# Patient Record
Sex: FEMALE | HISPANIC OR LATINO | Employment: OTHER | URBAN - METROPOLITAN AREA
[De-identification: names, ages, dates, MRNs, and addresses within clinical notes are randomized per-mention and may not be internally consistent; named-entity substitution may affect disease eponyms.]

---

## 2017-11-30 ENCOUNTER — OFFICE VISIT (OUTPATIENT)
Dept: INTERNAL MEDICINE | Facility: CLINIC | Age: 72
End: 2017-11-30
Payer: MEDICARE

## 2017-11-30 VITALS
OXYGEN SATURATION: 99 % | DIASTOLIC BLOOD PRESSURE: 102 MMHG | WEIGHT: 152.56 LBS | HEART RATE: 95 BPM | HEIGHT: 62 IN | SYSTOLIC BLOOD PRESSURE: 142 MMHG | BODY MASS INDEX: 28.07 KG/M2

## 2017-11-30 DIAGNOSIS — D68.00 VON WILLEBRAND DISEASE: ICD-10-CM

## 2017-11-30 DIAGNOSIS — E04.1 THYROID NODULE: ICD-10-CM

## 2017-11-30 DIAGNOSIS — E11.9 TYPE 2 DIABETES MELLITUS WITHOUT COMPLICATION, WITHOUT LONG-TERM CURRENT USE OF INSULIN: ICD-10-CM

## 2017-11-30 DIAGNOSIS — I10 HYPERTENSION, UNSPECIFIED TYPE: ICD-10-CM

## 2017-11-30 DIAGNOSIS — C50.411 MALIGNANT NEOPLASM OF UPPER-OUTER QUADRANT OF RIGHT FEMALE BREAST, UNSPECIFIED ESTROGEN RECEPTOR STATUS: Primary | ICD-10-CM

## 2017-11-30 DIAGNOSIS — K21.9 GASTROESOPHAGEAL REFLUX DISEASE, ESOPHAGITIS PRESENCE NOT SPECIFIED: ICD-10-CM

## 2017-11-30 DIAGNOSIS — M19.90 ARTHRITIS: ICD-10-CM

## 2017-11-30 PROCEDURE — 99204 OFFICE O/P NEW MOD 45 MIN: CPT | Mod: S$GLB,,, | Performed by: NURSE PRACTITIONER

## 2017-11-30 PROCEDURE — 99999 PR PBB SHADOW E&M-NEW PATIENT-LVL V: CPT | Mod: PBBFAC,,, | Performed by: NURSE PRACTITIONER

## 2017-11-30 RX ORDER — ARNICA 200 MG/ML
1 LIQUID TOPICAL DAILY
Qty: 120 ML | Refills: 0 | COMMUNITY
Start: 2017-11-30 | End: 2018-05-15

## 2017-11-30 RX ORDER — CAPTOPRIL 50 MG/1
50 TABLET ORAL 3 TIMES DAILY
COMMUNITY
End: 2018-02-15 | Stop reason: SDUPTHER

## 2017-11-30 RX ORDER — GLIMEPIRIDE 4 MG/1
4 TABLET ORAL
COMMUNITY
End: 2018-02-15 | Stop reason: SDUPTHER

## 2017-11-30 RX ORDER — OMEPRAZOLE 40 MG/1
20 CAPSULE, DELAYED RELEASE ORAL DAILY
COMMUNITY
End: 2018-02-15 | Stop reason: SDUPTHER

## 2017-11-30 RX ORDER — ANASTROZOLE 1 MG/1
1 TABLET ORAL DAILY
COMMUNITY
End: 2018-02-15 | Stop reason: SDUPTHER
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
## 2017-12-01 ENCOUNTER — LAB VISIT (OUTPATIENT)
Dept: LAB | Facility: HOSPITAL | Age: 72
End: 2017-12-01
Attending: NURSE PRACTITIONER
Payer: MEDICARE

## 2017-12-01 ENCOUNTER — TELEPHONE (OUTPATIENT)
Dept: INTERNAL MEDICINE | Facility: CLINIC | Age: 72
End: 2017-12-01

## 2017-12-01 DIAGNOSIS — C50.411 MALIGNANT NEOPLASM OF UPPER-OUTER QUADRANT OF RIGHT FEMALE BREAST, UNSPECIFIED ESTROGEN RECEPTOR STATUS: ICD-10-CM

## 2017-12-01 DIAGNOSIS — I10 HYPERTENSION, UNSPECIFIED TYPE: ICD-10-CM

## 2017-12-01 DIAGNOSIS — E11.9 TYPE 2 DIABETES MELLITUS WITHOUT COMPLICATION, WITHOUT LONG-TERM CURRENT USE OF INSULIN: ICD-10-CM

## 2017-12-01 PROBLEM — K21.9 GASTROESOPHAGEAL REFLUX DISEASE: Status: ACTIVE | Noted: 2017-12-01

## 2017-12-01 PROBLEM — E04.1 THYROID NODULE: Status: ACTIVE | Noted: 2017-12-01

## 2017-12-01 PROBLEM — M19.90 ARTHRITIS: Status: ACTIVE | Noted: 2017-12-01

## 2017-12-01 PROBLEM — D68.00 VON WILLEBRAND DISEASE: Status: ACTIVE | Noted: 2017-12-01

## 2017-12-01 LAB
ALBUMIN SERPL BCP-MCNC: 4.2 G/DL
ALP SERPL-CCNC: 108 U/L
ALT SERPL W/O P-5'-P-CCNC: 18 U/L
ANION GAP SERPL CALC-SCNC: 9 MMOL/L
AST SERPL-CCNC: 22 U/L
BASOPHILS # BLD AUTO: 0.02 K/UL
BASOPHILS NFR BLD: 0.4 %
BILIRUB SERPL-MCNC: 1.4 MG/DL
BUN SERPL-MCNC: 16 MG/DL
CALCIUM SERPL-MCNC: 10.4 MG/DL
CHLORIDE SERPL-SCNC: 101 MMOL/L
CHOLEST SERPL-MCNC: 162 MG/DL
CHOLEST/HDLC SERPL: 4.4 {RATIO}
CO2 SERPL-SCNC: 31 MMOL/L
CREAT SERPL-MCNC: 0.8 MG/DL
DIFFERENTIAL METHOD: NORMAL
EOSINOPHIL # BLD AUTO: 0.1 K/UL
EOSINOPHIL NFR BLD: 1.5 %
ERYTHROCYTE [DISTWIDTH] IN BLOOD BY AUTOMATED COUNT: 11.9 %
EST. GFR  (AFRICAN AMERICAN): >60 ML/MIN/1.73 M^2
EST. GFR  (NON AFRICAN AMERICAN): >60 ML/MIN/1.73 M^2
ESTIMATED AVG GLUCOSE: 143 MG/DL
GLUCOSE SERPL-MCNC: 122 MG/DL
HBA1C MFR BLD HPLC: 6.6 %
HCT VFR BLD AUTO: 39.5 %
HDLC SERPL-MCNC: 37 MG/DL
HDLC SERPL: 22.8 %
HGB BLD-MCNC: 13.3 G/DL
LDLC SERPL CALC-MCNC: 78.4 MG/DL
LYMPHOCYTES # BLD AUTO: 1.7 K/UL
LYMPHOCYTES NFR BLD: 34.7 %
MCH RBC QN AUTO: 30.1 PG
MCHC RBC AUTO-ENTMCNC: 33.7 G/DL
MCV RBC AUTO: 89 FL
MONOCYTES # BLD AUTO: 0.5 K/UL
MONOCYTES NFR BLD: 10.8 %
NEUTROPHILS # BLD AUTO: 2.5 K/UL
NEUTROPHILS NFR BLD: 52.6 %
NONHDLC SERPL-MCNC: 125 MG/DL
PLATELET # BLD AUTO: 194 K/UL
PMV BLD AUTO: 11 FL
POTASSIUM SERPL-SCNC: 4.2 MMOL/L
PROT SERPL-MCNC: 7.9 G/DL
RBC # BLD AUTO: 4.42 M/UL
SODIUM SERPL-SCNC: 141 MMOL/L
TRIGL SERPL-MCNC: 233 MG/DL
WBC # BLD AUTO: 4.81 K/UL

## 2017-12-01 PROCEDURE — 80061 LIPID PANEL: CPT

## 2017-12-01 PROCEDURE — 80053 COMPREHEN METABOLIC PANEL: CPT

## 2017-12-01 PROCEDURE — 83036 HEMOGLOBIN GLYCOSYLATED A1C: CPT

## 2017-12-01 PROCEDURE — 36415 COLL VENOUS BLD VENIPUNCTURE: CPT

## 2017-12-01 PROCEDURE — 85025 COMPLETE CBC W/AUTO DIFF WBC: CPT

## 2017-12-01 NOTE — TELEPHONE ENCOUNTER
----- Message from Vesta Townsend RN sent at 12/1/2017 11:38 AM CST -----  Contact: self  Patient said she brought medical records to the visit yesterday.  Does your office still have it or has it been sent to scanning?    - Vesta Townsend RN    Cancer Navigator  ----- Message -----  From: Mora Rubi RN  Sent: 12/1/2017  10:49 AM  To: Vesta Townsend RN        ----- Message -----  From: Leighann Espinoza  Sent: 11/30/2017  10:16 AM  To: Mora Rubi RN    Pt has a referral per Ms Ervin with the diagnosis Malignant neoplasm of upper-outer quadrant of right female breast, unspecified estrogen receptor status [C50.411]. Please call pt to schedule. Thanks!

## 2017-12-04 ENCOUNTER — TELEPHONE (OUTPATIENT)
Dept: HEMATOLOGY/ONCOLOGY | Facility: CLINIC | Age: 72
End: 2017-12-04
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
## 2017-12-05 ENCOUNTER — OFFICE VISIT (OUTPATIENT)
Dept: OPTOMETRY | Facility: CLINIC | Age: 72
End: 2017-12-05
Payer: MEDICARE

## 2017-12-05 ENCOUNTER — TELEPHONE (OUTPATIENT)
Dept: INTERNAL MEDICINE | Facility: CLINIC | Age: 72
End: 2017-12-05

## 2017-12-05 DIAGNOSIS — H52.4 PRESBYOPIA: ICD-10-CM

## 2017-12-05 DIAGNOSIS — E11.9 TYPE 2 DIABETES MELLITUS WITHOUT OPHTHALMIC MANIFESTATIONS: Primary | ICD-10-CM

## 2017-12-05 DIAGNOSIS — E78.49 OTHER HYPERLIPIDEMIA: ICD-10-CM

## 2017-12-05 DIAGNOSIS — H25.13 NUCLEAR SCLEROSIS OF BOTH EYES: ICD-10-CM

## 2017-12-05 PROCEDURE — 99999 PR PBB SHADOW E&M-EST. PATIENT-LVL II: CPT | Mod: PBBFAC,,, | Performed by: OPTOMETRIST

## 2017-12-05 PROCEDURE — 92004 COMPRE OPH EXAM NEW PT 1/>: CPT | Mod: S$GLB,,, | Performed by: OPTOMETRIST

## 2017-12-05 RX ORDER — ATORVASTATIN CALCIUM 10 MG/1
10 TABLET, FILM COATED ORAL DAILY
Qty: 90 TABLET | Refills: 3 | Status: SHIPPED | OUTPATIENT
Start: 2017-12-05 | End: 2018-02-15 | Stop reason: SDUPTHER

## 2017-12-05 NOTE — TELEPHONE ENCOUNTER
----- Message from Екатерина Ervin NP sent at 12/5/2017 12:51 PM CST -----  Pt is [place] speaking: Please let pt know her labs were stable. Cholesterol is not at goal. Will start on lipitor to decrease cholesterol. Please take at night

## 2017-12-05 NOTE — LETTER
December 5, 2017      Екатерина Ervin, NP  [address]  [facility] [address]           [facility]  [address]  [facility] [address]  Phone: [phone]  Fax: [phone]          Patient: Melissa Jarquin   MR Number: [id number]   YOB: 1945   Date of Visit: 12/5/2017       Dear Екатерина Ervin:    Thank you for referring Melissa Jarquin to me for evaluation. Attached you will find relevant portions of my assessment and plan of care.    If you have questions, please do not hesitate to call me. I look forward to following Melissa Jarquin along with you.    Sincerely,    Letitia Joseph, OD    Enclosure  CC:  No Recipients    If you would like to receive this communication electronically, please contact [email or web address] or [phone] to request more information on [employer] access.    For providers and/or their staff who would like to refer a patient to [facility], please contact us through our one-stop-shop provider referral line, [facility], at [phone].    If you feel you have received this communication in error or would no longer like to receive these types of communications, please e-mail [email or web address]

## 2017-12-05 NOTE — PROGRESS NOTES
HPI     Patient's last dilated exam was: 02/2017  Pt here for diabetic eye exam. Happy with current glasses, does not want   to update glasses, she was seen in February and told no changes in her rx.   here for ocular health check only. C/o occasional itching in the temporal   corners of her eyes, hailey in the morning. Slight blurred vision in the   morning, no tearing, dryness, burning. Patient denies flashes, floaters,   pain and double vision. Not using any gtts. Blood sugar was 137 yesterday      Hemoglobin A1C       Date                     Value               Ref Range             Status                12/01/2017               6.6 (H)             4.0 - 5.6 %           Final                  Last edited by Carol Reece, PCT on 12/5/2017  2:25 PM.   (History)            Assessment /Plan     For exam results, see Encounter Report.    Type 2 diabetes mellitus without ophthalmic manifestations    Nuclear sclerosis of both eyes    Presbyopia            1.  No retinopathy--monitor yearly.  BS control.  2.  Educated on cataracts and affects on vision.  Monitor.  3.  Continue w/ current rx.  Eye health normal OU.        RTC 1 year for dm exam.

## 2017-12-14 ENCOUNTER — TELEPHONE (OUTPATIENT)
Dept: HEMATOLOGY/ONCOLOGY | Facility: CLINIC | Age: 72
End: 2017-12-14

## 2018-01-03 ENCOUNTER — CLINICAL SUPPORT (OUTPATIENT)
Dept: REHABILITATION | Facility: HOSPITAL | Age: 73
End: 2018-01-03
Payer: MEDICARE

## 2018-01-03 DIAGNOSIS — G89.18 POST-MASTECTOMY PAIN: ICD-10-CM

## 2018-01-03 DIAGNOSIS — I97.2 POST-MASTECTOMY LYMPHEDEMA SYNDROME: ICD-10-CM

## 2018-01-03 PROCEDURE — 97162 PT EVAL MOD COMPLEX 30 MIN: CPT

## 2018-01-03 PROCEDURE — 97110 THERAPEUTIC EXERCISES: CPT

## 2018-01-03 PROCEDURE — G8984 CARRY CURRENT STATUS: HCPCS | Mod: CK

## 2018-01-03 PROCEDURE — G8985 CARRY GOAL STATUS: HCPCS | Mod: CJ

## 2018-01-03 PROCEDURE — 97535 SELF CARE MNGMENT TRAINING: CPT
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
## 2018-01-10 ENCOUNTER — CLINICAL SUPPORT (OUTPATIENT)
Dept: REHABILITATION | Facility: HOSPITAL | Age: 73
End: 2018-01-10
Payer: MEDICARE

## 2018-01-10 DIAGNOSIS — G89.18 POST-MASTECTOMY PAIN: ICD-10-CM

## 2018-01-10 DIAGNOSIS — I97.2 POST-MASTECTOMY LYMPHEDEMA SYNDROME: ICD-10-CM

## 2018-01-10 PROCEDURE — 97140 MANUAL THERAPY 1/> REGIONS: CPT

## 2018-01-10 PROCEDURE — 97110 THERAPEUTIC EXERCISES: CPT

## 2018-01-26 ENCOUNTER — CLINICAL SUPPORT (OUTPATIENT)
Dept: REHABILITATION | Facility: HOSPITAL | Age: 73
End: 2018-01-26
Payer: MEDICARE

## 2018-01-26 DIAGNOSIS — I97.2 POST-MASTECTOMY LYMPHEDEMA SYNDROME: ICD-10-CM

## 2018-01-26 DIAGNOSIS — G89.18 POST-MASTECTOMY PAIN: ICD-10-CM

## 2018-01-26 PROCEDURE — 97110 THERAPEUTIC EXERCISES: CPT

## 2018-01-26 PROCEDURE — 97140 MANUAL THERAPY 1/> REGIONS: CPT

## 2018-01-26 NOTE — PROGRESS NOTES
Physician: Rosemary Ritter [facility]  Diagnosis: R breast CA with lumpectomy 1/2016 6 nodes removed, chemotherapy and radiation in [place]  Encounter Diagnoses   Name Primary?    Post-mastectomy lymphedema syndrome     Post-mastectomy pain         Visit #:3  Treatment: manual therapy, therex, education  * Pt and her grandson were given information for Anguillan translation - they again declined.    SUBJECTIVE: Pt's grandson is present entire visit and translates Anguillan- They bring Xray report of R shoulder from [facility]  8/18/2017 Impression :  Calcific rotator cuff tendinopathy,  post operative change   pt does understand some English for basic commands- grandson translates all - Pt has some shoulder pain that is mild most all of the time and certain motions increase discomfort.  Pt does not report breast pain or complaints.   Pt has been performing HEP.   Pain: 2/10 R shoulder  OBJECTIVE: Anguillan speaking female- amb to dept no device  Grandson present for full translation  L port in place  R breast with lumpectomy and AND sites fully healed  R breast with some density surrounding nipple and inf segment of breast  Amount of Swelling:Location of Swelling: little to no lymphedema appreciated to R UE  Fullness and some density to R breast  Tightness and pulling in axilla and breast with R SH ROM  Mild soreness to R pec major near ant chest and humerus  Skin Integrity: healed, dryness similar R and L UE  Palpation/Texture: mld/mod to R breast and tautness axilla  Circulation: intact     Posture:FH rounded shoulders protraction- min correction with repeat cueing     Range of Motion - UE  (R)  Supine AAROM - ~ 145 flex, ~ 140 abd AROM due to c/o pulling and tightness in axilla and breast    ROT ER at 90 wfl,  IR with mild discomfort at end ranges  (L) mild limits end range no pain  Treatment:   MANUAL LYMPHATIC DRAINAGE:  While supine with arm elevated,  Drainage along neck, B subclavian regions,   Across ant chest and top of shoulder,  Axillary region, drainage of upper arm with return of all areas proximally, scar massage and drainage of her R axilla and R breast with repeat across chest and axillary regions  In sidelying stimulation of substitution pathways,  drainage post arm, and across back and down along trunk  SEQUENTIAL COMPRESSION PUMP: na  MULTILAYERED BANDAGING:  Na  THERAPEUTIC EXERCISES:    Gentle sh distraction, long axis   Inf glides  Assistance with ROM  Arm place overhead in sh abd flex and er on pillow- stm and massage to axilla, ant and lateral chest wall  ROM and soft tissue mobility to ribs and shoulder blade  sidelying reach protraction and retraction   Reviewed all prior ROM and postural corrections: dowel assisted sh flex and abd,  Scapular retractions, pec stretch in corner or doorway,  Thor sbing with arms overhead,  UE reach with spinal elongation, bow and arrow style stretch of reach and rotate, and UE and spinal elongation of table walk aways.    Performed assisted stretching and ROM R upper quadrant, stm and mobility to ant and lateral chest wall and axilla  R UE ROM in flex abd and er  Added sidelying sh er, sh abd  Performed Pulley assisted flex  Issued orange T band  Performed rowing and retraction with EE and EF  Sh IR and ER R with orange- grandson instructed in band placement in door  Confirmed understanding of all exercises with him and her.   Pt was able to demonstrate and voice understanding of performance. Continue HEP of AROM, stretching, and postural correction.   PATIENT/FAMILY Education:  HEP,  Beginning of self massage,  Risk reduction  Confirmed all understanding with patient and grandson  ASSESSMENT: Pt has some pec tightness and effects post lumpectomy with node dissection and radiation but also has arthritic and calcific changes to her R shoulder with rotator cuff changes.  Working to address posture, ROM, tissue mobility and progression to improving function  with less pain.   Mild density to breast but no sings of R UE lymphedema.   Pt is showing more flexibility and soft tissue mobility in her R upper quadrant. Pt needs to continue to address postural correction and some tightness in her R upper quadrant post CA lumpectomy as well as radiation changes to anterior, lateral chest walls, breast and axilla.    Patient is making  good     progress towards established goals.  PLAN: Continue PT   1-2x         weekly for Complete Decongestive Therapy:  Manual lymphatic drainage, Multilayered short stretch bandaging, Pneumatic compression, Therapeutic exercises, Patient education as deemed necessary to achieve stated goals.  Plan of care for Yessy Mcgill PTA.

## 2018-01-29 ENCOUNTER — TELEPHONE (OUTPATIENT)
Dept: HEMATOLOGY/ONCOLOGY | Facility: CLINIC | Age: 73
End: 2018-01-29

## 2018-01-29 ENCOUNTER — CLINICAL SUPPORT (OUTPATIENT)
Dept: REHABILITATION | Facility: HOSPITAL | Age: 73
End: 2018-01-29
Payer: MEDICARE

## 2018-01-29 DIAGNOSIS — G89.18 POST-MASTECTOMY PAIN: ICD-10-CM

## 2018-01-29 DIAGNOSIS — M19.90 ARTHRITIS: Primary | ICD-10-CM

## 2018-01-29 DIAGNOSIS — I97.2 POST-MASTECTOMY LYMPHEDEMA SYNDROME: ICD-10-CM

## 2018-01-29 PROCEDURE — 97110 THERAPEUTIC EXERCISES: CPT

## 2018-01-29 PROCEDURE — 97140 MANUAL THERAPY 1/> REGIONS: CPT

## 2018-01-31 NOTE — PROGRESS NOTES
Physical Therapy Progress Note - Hip/Knee/Ankle/Spine  R shoulder pain/weakness     Subjective:  Patient reports pain 5/10 prior therapy and decrease at the end of session.     Objective:  Treatment:   Pt received therapeutic exercises to improve ROM, strength, flexibility and proprioception such as:   UBE scapula retraction/protraction 3 minutes each direction   Door pulleys shoulder scaption 3 minutes   Shoulder flexion and HA AAROM using theraball 10 reps each   Shoulder row and extension otb 10 reps each   Shoulder IR/ER walk out otb 10 reps each - Josefina for proper body mechanics   Supine:  shoulder flexion using stick 10 reps   Elbow extension using stick 10 reps   Pt received PROM/gentle stretch on R shoulder in all available plane several trials   ROM:not tested today     Written Home Exercises Provided:no changes made.   Instructed pt. regarding: Proper technique with all exercises. Pt demonstrated good understanding of the education provided. No cultural, environmental, or spiritual barriers identified to treatment or learning.    Assessment:Pt with good tolerance to PT today, no increase of pain during session. Pt presents very weak UEs muscles and needs Josefina some time to complete motion. Pt also needs tactile cues for proper body mechanics.   Pt will continue to benefit from skilled PT intervention. Medical Necessity is demonstrated by:  Unable to participate in daily activities    Patient is making Good progress towards established goals.    New/Revised Goals:remain appropriated     Plan:  Continue with established Plan of Care towards PT goals. PT/PTA face-to-face:discussed Pt's POC and progress towards established PT goals.  Yessy Mcgill PTA  Therex:30 minutes   Soft Tissue Mobs:10 minutes

## 2018-02-06 ENCOUNTER — CLINICAL SUPPORT (OUTPATIENT)
Dept: REHABILITATION | Facility: HOSPITAL | Age: 73
End: 2018-02-06
Payer: MEDICARE

## 2018-02-06 DIAGNOSIS — G89.18 POST-MASTECTOMY PAIN: ICD-10-CM

## 2018-02-06 DIAGNOSIS — I97.2 POST-MASTECTOMY LYMPHEDEMA SYNDROME: ICD-10-CM

## 2018-02-06 PROCEDURE — 97140 MANUAL THERAPY 1/> REGIONS: CPT

## 2018-02-06 PROCEDURE — 97110 THERAPEUTIC EXERCISES: CPT

## 2018-02-07 NOTE — PROGRESS NOTES
Physical Therapy Progress Note - Hip/Knee/Ankle/Spine  R shoulder pain/weakness     Subjective:  Patient reports pain 4/10 today. Pt states feeling much better since started therapy. PTA speaking Romanian with Pt and received 1:1 care today.     Objective:  Treatment:   Pt received therapeutic exercises to improve ROM, strength, flexibility and proprioception such as:   UBE scapula retraction/protraction 3 minutes each direction   Door pulleys shoulder scaption 3 minutes   Shoulder flexion and HA AAROM using theraball 10 reps each   Shoulder row and extension otb 10 reps each   Shoulder IR/ER walk out otb 10 reps each - Josefina for proper body mechanics   Supine:  shoulder flexion using stick 10 reps   Elbow extension using stick 10 reps   Pt received PROM/gentle stretch on R shoulder in all available plane several trials   ROM:not tested today     Written Home Exercises Provided:no changes made.   Instructed pt. regarding: Proper technique with all exercises. Pt demonstrated good understanding of the education provided. No cultural, environmental, or spiritual barriers identified to treatment or learning.    Assessment:Pt with good tolerance to PT today, no increase of pain during session. Pt with less assistance to complete motion today. Pt also needs tactile cues for proper body mechanics.   Pt will continue to benefit from skilled PT intervention. Medical Necessity is demonstrated by:  Unable to participate in daily activities    Patient is making Good progress towards established goals.    New/Revised Goals:remain appropriated     Plan:  Continue with established Plan of Care towards PT goals.   Therex:30 minutes   Soft Tissue Mobs:10 minutes

## 2018-02-14 ENCOUNTER — CLINICAL SUPPORT (OUTPATIENT)
Dept: REHABILITATION | Facility: HOSPITAL | Age: 73
End: 2018-02-14
Payer: MEDICARE

## 2018-02-14 DIAGNOSIS — G89.18 POST-MASTECTOMY PAIN: ICD-10-CM

## 2018-02-14 DIAGNOSIS — I97.2 POST-MASTECTOMY LYMPHEDEMA SYNDROME: ICD-10-CM

## 2018-02-14 PROCEDURE — 97110 THERAPEUTIC EXERCISES: CPT

## 2018-02-14 PROCEDURE — 97140 MANUAL THERAPY 1/> REGIONS: CPT

## 2018-02-14 NOTE — PROGRESS NOTES
Physician: Rosemary Ritter [facility]  Diagnosis: R breast CA with lumpectomy 1/2016 6 nodes removed, chemotherapy and radiation in [place]  Encounter Diagnoses   Name Primary?    Post-mastectomy lymphedema syndrome     Post-mastectomy pain         Visit #:6  5th visit FOTO 49/100  Treatment: manual therapy, therex, education  * Pt and her grandson have declined Latvian Interpretor.     SUBJECTIVE: Pt's grandson is present entire visit and translates Latvian- Pt is moving more with less discomfort ~ 3-4/10 R shoulder.  Pt did voice mild pulling/soreness in L sh at end ranges mostly near port site- confirmed with grandson/pt- avoid excessive pulling at port site- stay below tension- voiced understanding.   pt does understand some English for basic commands- grandson translates all -   Pt is no longer having much breast pain- mostly shoulder soreness.  She laughs when encouraged to correct her posture.   Pt has been performing HEP.   Pain: 2/10 R shoulder  OBJECTIVE: Latvian speaking female- amb to dept no device  Grandson present for full translation  L port in place  R breast with lumpectomy and AND sites fully healed  R breast with some density surrounding nipple and inf segment of breast  Amount of Swelling:Location of Swelling: little to no lymphedema appreciated to R UE  Fullness and some density to R breast  Tightness and pulling in axilla and breast with R SH ROM  Mild soreness to R pec major near ant chest and humerus  Skin Integrity: healed, dryness similar R and L UE  Palpation/Texture: mld to R breast and tautness axilla  Circulation: intact     Posture:FH rounded shoulders protraction- min correction with repeat cueing     Range of Motion - UE  (R)  sitting AAROM - Sh flex 0-150  abd 0-140  Supine AAROM sh abd at 90 ER to 70-80  IR with mild discomfort at end ranges  (L) UE noted mild pulling at port site with full shoulder motions  Treatment:   MANUAL LYMPHATIC DRAINAGE:  While supine  with arm elevated,  Drainage along neck, B subclavian regions,  Across ant chest and top of shoulder,  Axillary region, drainage of upper arm with return of all areas proximally, scar massage and drainage of her R axilla and R breast with repeat across chest and axillary regions  In sidelying stimulation of substitution pathways,  drainage post arm, and across back and down along trunk  SEQUENTIAL COMPRESSION PUMP: na  MULTILAYERED BANDAGING:  Na  THERAPEUTIC EXERCISES:    Gentle sh distraction, long axis   Inf glides  Assistance with ROM  Arm place overhead in sh abd flex and er on pillow- stm and massage to axilla, ant and lateral chest wall  ROM and soft tissue mobility to ribs and shoulder blade  sidelying reach protraction and retraction   Reviewed all prior ROM and postural corrections: dowel assisted sh flex and abd,  Scapular retractions, pec stretch in corner or doorway,  Thor sbing with arms overhead,  UE reach with spinal elongation, bow and arrow style stretch of reach and rotate, and UE and spinal elongation of table walk aways.    Performed assisted stretching and ROM R upper quadrant, stm and mobility to ant and lateral chest wall and axilla  R UE ROM in flex abd and er  Pt received therapeutic exercises to improve ROM, strength, flexibility and proprioception such as:   UBE scapula retraction/protraction 3 minutes each direction   Door pulleys shoulder scaption 3 minutes   Shoulder flexion - dowel assisted x 15   Shoulder IR/ER walk out otb 10 reps each - progressed to SH IR/ER with OTB used towel at elbow- Josefina for proper body mechanics   Rowing and retraction with OTB in EE and EF x 15 each  sidelying sh er 1#  Sh abd   Performed rowing and retraction with EE and EF  Sh IR and ER R with orange- debo instructed in band placement in door  Confirmed understanding of all exercises with him and her.   Pt was able to demonstrate and voice understanding of performance. Continue HEP of AROM,  stretching, and postural correction.   PATIENT/FAMILY Education:  HEP,  Beginning of self massage,  Risk reduction  Confirmed all understanding with patient and grandson  ASSESSMENT: Pt is showing improved R sh ROM with less complaints of tightness- she continues to have some pec tightness and altered posture and body mechanics.   Pt has noted arthritic and calcific changes to her R shoulder with rotator cuff changes.per Xray.   Pt will need more ROM, strengthening and progression of HEP.   Working to address posture, ROM, tissue mobility and progression to improving function with less pain.   Mild density to breast but no signs of R UE lymphedema.   Pt is showing more flexibility and soft tissue mobility in her R upper quadrant. Pt needs to continue to address postural correction and some tightness in her R upper quadrant post CA lumpectomy as well as radiation changes to anterior, lateral chest walls, breast and axilla.    Patient is making  good     progress towards established goals.  PLAN: Continue PT   1-2x         weekly for Complete Decongestive Therapy:  Manual lymphatic drainage, Multilayered short stretch bandaging, Pneumatic compression, Therapeutic exercises, Patient education as deemed necessary to achieve stated goals.  Plan of care for Yessy Mcgill PTA. Discussed in face to face meeting.

## 2018-02-15 ENCOUNTER — OFFICE VISIT (OUTPATIENT)
Dept: INTERNAL MEDICINE | Facility: CLINIC | Age: 73
End: 2018-02-15
Payer: MEDICARE

## 2018-02-15 VITALS
OXYGEN SATURATION: 98 % | WEIGHT: 152.56 LBS | BODY MASS INDEX: 28.07 KG/M2 | HEIGHT: 62 IN | HEART RATE: 105 BPM | SYSTOLIC BLOOD PRESSURE: 128 MMHG | DIASTOLIC BLOOD PRESSURE: 84 MMHG

## 2018-02-15 DIAGNOSIS — I10 HYPERTENSION, UNSPECIFIED TYPE: ICD-10-CM

## 2018-02-15 DIAGNOSIS — E78.49 OTHER HYPERLIPIDEMIA: ICD-10-CM

## 2018-02-15 DIAGNOSIS — C50.411 MALIGNANT NEOPLASM OF UPPER-OUTER QUADRANT OF RIGHT FEMALE BREAST, UNSPECIFIED ESTROGEN RECEPTOR STATUS: ICD-10-CM

## 2018-02-15 DIAGNOSIS — K21.9 GASTROESOPHAGEAL REFLUX DISEASE, ESOPHAGITIS PRESENCE NOT SPECIFIED: ICD-10-CM

## 2018-02-15 DIAGNOSIS — M70.71 BURSITIS OF RIGHT HIP, UNSPECIFIED BURSA: ICD-10-CM

## 2018-02-15 DIAGNOSIS — M25.551 RIGHT HIP PAIN: Primary | ICD-10-CM

## 2018-02-15 DIAGNOSIS — E11.9 TYPE 2 DIABETES MELLITUS WITHOUT COMPLICATION, WITHOUT LONG-TERM CURRENT USE OF INSULIN: ICD-10-CM

## 2018-02-15 PROCEDURE — 1125F AMNT PAIN NOTED PAIN PRSNT: CPT | Mod: S$GLB,,, | Performed by: NURSE PRACTITIONER

## 2018-02-15 PROCEDURE — 99999 PR PBB SHADOW E&M-EST. PATIENT-LVL III: CPT | Mod: PBBFAC,,, | Performed by: NURSE PRACTITIONER

## 2018-02-15 PROCEDURE — 3008F BODY MASS INDEX DOCD: CPT | Mod: S$GLB,,, | Performed by: NURSE PRACTITIONER

## 2018-02-15 PROCEDURE — 99214 OFFICE O/P EST MOD 30 MIN: CPT | Mod: S$GLB,,, | Performed by: NURSE PRACTITIONER

## 2018-02-15 PROCEDURE — 1159F MED LIST DOCD IN RCRD: CPT | Mod: S$GLB,,, | Performed by: NURSE PRACTITIONER

## 2018-02-15 RX ORDER — GLIMEPIRIDE 4 MG/1
4 TABLET ORAL
Qty: 90 TABLET | Refills: 2 | Status: SHIPPED | OUTPATIENT
Start: 2018-02-15

## 2018-02-15 RX ORDER — PREDNISONE 20 MG/1
20 TABLET ORAL 2 TIMES DAILY
Qty: 20 TABLET | Refills: 0 | Status: SHIPPED | OUTPATIENT
Start: 2018-02-15 | End: 2018-02-25

## 2018-02-15 RX ORDER — BACLOFEN 10 MG/1
10 TABLET ORAL 3 TIMES DAILY
COMMUNITY

## 2018-02-15 RX ORDER — ATORVASTATIN CALCIUM 10 MG/1
10 TABLET, FILM COATED ORAL DAILY
Qty: 90 TABLET | Refills: 3 | Status: SHIPPED | OUTPATIENT
Start: 2018-02-15 | End: 2018-02-15 | Stop reason: SDUPTHER

## 2018-02-15 RX ORDER — ANASTROZOLE 1 MG/1
1 TABLET ORAL DAILY
Qty: 90 TABLET | Refills: 3 | Status: SHIPPED | OUTPATIENT
Start: 2018-02-15

## 2018-02-15 RX ORDER — CAPTOPRIL 50 MG/1
50 TABLET ORAL 3 TIMES DAILY
Qty: 270 TABLET | Refills: 1 | Status: SHIPPED | OUTPATIENT
Start: 2018-02-15 | End: 2018-05-16

## 2018-02-15 RX ORDER — OMEPRAZOLE 20 MG/1
20 CAPSULE, DELAYED RELEASE ORAL DAILY
Qty: 90 CAPSULE | Refills: 1 | Status: SHIPPED | OUTPATIENT
Start: 2018-02-15

## 2018-02-15 RX ORDER — ATORVASTATIN CALCIUM 10 MG/1
10 TABLET, FILM COATED ORAL DAILY
Qty: 90 TABLET | Refills: 3 | Status: SHIPPED | OUTPATIENT
Start: 2018-02-15 | End: 2019-02-15

## 2018-02-15 NOTE — PATIENT INSTRUCTIONS
Make Oncology appointment     Will start lipitor (atorvastatin) for cholesterol     Prednisone twice daily for right hip bursitis     Refilled all medicines.     Please make follow up appointment with primary care provider (PCP) in 3 months   Bursitis [Bursitis]    Las articulaciones mayores del cuerpo están rodeadas por vinay bolsa pequeña y aplanada, llena de líquido, para facilitar el movimiento de los músculos y los tendones sobre la articulación.  La bursitis es vinay inflamación de la bolsa a consecuencia de vinay lesión, sobreuso de la articulación o infección de la bolsa. Los síntomas son dolor y sensibilidad en la articulación, que se agravan con el movimiento.  La bursitis se trata con medicamentos antiinflamatorios y con el descanso de la articulación. Los casos más graves requieren la inyección de medicamentos directamente en la bolsa.  Cuidados En [place]:  1. El primer día, aplique vinay bolsa de hielo (cubitos de hielo en vinay bolsa de plástico envuelta en vinay toalla) sobre la jose lesionada beltran 20 minutos cada 1-2 horas. Continúe esta práctica 3-4 veces al día hasta que el dolor y la hinchazón hayan flaco.  2. Descanse la articulación dolorida y protéjala del movimiento. [place] permitirá que la inflamación sane antes.  3. Puede rodo ibuprofeno (Motrin o Advil) o naproxeno (Aleve o Naprosyn) para aliviar el dolor y la inflamación, a menos que le hayan recetado otro medicamento. Si no puede rodo estos medicamentos, el acetaminofén (Tylenol) puede ayudarle a aliviar el dolor, eneida no trata la inflamación. [NOTA: Si tiene vinay enfermedad hepática o renal crónica, o ha tenido alguna vez vinay úlcera estomacal o sangrado gastrointestinal, consulte con mosqueda médico antes de rodo estos medicamentos.]  4. A medida que mejoran gm síntomas, comience a  gradualmente la articulación. No use demasiado la articulación, ya que esto puede hacer que reaparezcan los síntomas.  Programe vinay VISITA DE CONTROL con mosqueda  médico si no mejora al cabo de fay días de tratamiento.  Busque Prontamente Atención Médica  si algo de lo siguiente ocurre:  · Enrojecimiento sobre la jose dolorida  · Dolor o hinchazón crecientes en la articulación  · Fiebre de 100.4°F (38°C) o más ej, o roselyn le haya indicado mosqueda proveedor de atención médica  Date Last Reviewed: 11/21/2015  © 8167-2628 "[employer] [address] [place] [address], [place] [address]. Todos los derechos reservados. Esta información no pretende sustituir la atención médica profesional. Sólo mosqueda médico puede diagnosticar y tratar un problema de richar.

## 2018-02-15 NOTE — PROGRESS NOTES
INTERNAL MEDICINE PROGRESS NOTE    CHIEF COMPLAINT     Chief Complaint   Patient presents with    Medication Refill    Hip Pain     R hip pain, x 2days        HPI     Melissa Jarquin is a 72 y.o. female who presents for a follow up visit today.    States she needs a refill on all medications.     HTN- compliant with captopil     HLD- not taking lipitor     DM- compliant with glimepiride and ace-I.     Breast cancer- on arimidex. Has not followed up with heme/onc.     Arthritis of the knees and hips with acute right hip pain x 2 days. Reports h/o hip pain which resolved but has returns. Denies injury and trauma. Pt is seeing PT for shoulder pain and lymphedema.   aggravating factors- sleeping on the right side. Walking.   Relieving factors- tylenol. Also treating with baclofen as needed.      Past Medical History:  Past Medical History:   Diagnosis Date    Arthritis     Breast cancer     Diabetes mellitus, type 2     Hypertension     Other hyperlipidemia 12/5/2017       Home Medications:  Prior to Admission medications    Medication Sig Start Date End Date Taking? Authorizing Provider   anastrozole (ARIMIDEX) 1 mg Tab Take 1 mg by mouth once daily.    Historical Provider, MD   arnica 20 % Tinc Apply 1 application topically once daily. 11/30/17   Екатерина Ervin NP   atorvastatin (LIPITOR) 10 MG tablet Take 1 tablet (10 mg total) by mouth once daily. 12/5/17 12/5/18  Екатерина Ervin NP   captopril (CAPOTEN) 50 MG tablet Take 50 mg by mouth 3 (three) times daily.    Historical Provider, MD   glimepiride (AMARYL) 4 MG tablet Take 4 mg by mouth before breakfast.    Historical Provider, MD   omeprazole (PRILOSEC) 40 MG capsule Take 40 mg by mouth once daily.    Historical Provider, MD       Review of Systems:  Review of Systems   Musculoskeletal: Positive for arthralgias (right hip pain and lower back pain ). Negative for gait problem.       Health Maintainence:   Immunizations:  Health  "Maintenance       Date Due Completion Date    Hepatitis C Screening 1945 ---    Foot Exam 07/25/1957 ---    TETANUS VACCINE 07/25/1965 ---    Mammogram 07/25/1987 ---    DEXA SCAN 07/25/1987 ---    Colonoscopy 07/25/1997 ---    Zoster Vaccine 07/25/2007 ---    Pneumococcal (65+) (1 of 2 - PCV13) 07/25/2012 ---    Influenza Vaccine 08/01/2017 ---    Hemoglobin A1c 06/01/2018 12/1/2017    Lipid Panel 12/01/2018 12/1/2017    Low Dose Statin 12/05/2018 12/5/2017    Eye Exam 12/05/2018 12/5/2017           PHYSICAL EXAM     /84 (BP Location: Right arm, Patient Position: Sitting, BP Method: Large (Manual))   Pulse 105   Ht 5' 2" (1.575 m)   Wt 69.2 kg (152 lb 8.9 oz)   SpO2 98%   BMI 27.90 kg/m²     Physical Exam   Constitutional: She is oriented to person, place, and time. She appears well-developed and well-nourished.   HENT:   Head: Normocephalic.   Right Ear: External ear normal.   Left Ear: External ear normal.   Nose: Nose normal.   Mouth/Throat: Oropharynx is clear and moist. No oropharyngeal exudate.   Eyes: Pupils are equal, round, and reactive to light.   Neck: Neck supple. No JVD present. No tracheal deviation present. No thyromegaly present.   Cardiovascular: Normal rate, regular rhythm, normal heart sounds and intact distal pulses.  Exam reveals no gallop and no friction rub.    No murmur heard.  Pulmonary/Chest: Effort normal and breath sounds normal. No respiratory distress. She has no wheezes. She has no rales.   Abdominal: Soft. Bowel sounds are normal. She exhibits no distension. There is no tenderness.   Musculoskeletal: Normal range of motion. She exhibits no edema.        Right hip: She exhibits tenderness. She exhibits normal range of motion, normal strength, no swelling, no crepitus, no deformity and no laceration.        Legs:  Lymphadenopathy:     She has no cervical adenopathy.   Neurological: She is alert and oriented to person, place, and time.   Skin: Skin is warm and dry. No " rash noted.   Psychiatric: She has a normal mood and affect. Her behavior is normal.   Vitals reviewed.      LABS     Lab Results   Component Value Date    HGBA1C 6.6 (H) 12/01/2017     CMP  Sodium   Date Value Ref Range Status   12/01/2017 141 136 - 145 mmol/L Final     Potassium   Date Value Ref Range Status   12/01/2017 4.2 3.5 - 5.1 mmol/L Final     Chloride   Date Value Ref Range Status   12/01/2017 101 95 - 110 mmol/L Final     CO2   Date Value Ref Range Status   12/01/2017 31 (H) 23 - 29 mmol/L Final     Glucose   Date Value Ref Range Status   12/01/2017 122 (H) 70 - 110 mg/dL Final     BUN, Bld   Date Value Ref Range Status   12/01/2017 16 8 - 23 mg/dL Final     Creatinine   Date Value Ref Range Status   12/01/2017 0.8 0.5 - 1.4 mg/dL Final     Calcium   Date Value Ref Range Status   12/01/2017 10.4 8.7 - 10.5 mg/dL Final     Total Protein   Date Value Ref Range Status   12/01/2017 7.9 6.0 - 8.4 g/dL Final     Albumin   Date Value Ref Range Status   12/01/2017 4.2 3.5 - 5.2 g/dL Final     Total Bilirubin   Date Value Ref Range Status   12/01/2017 1.4 (H) 0.1 - 1.0 mg/dL Final     Comment:     For infants and newborns, interpretation of results should be based  on gestational age, weight and in agreement with clinical  observations.  Premature Infant recommended reference ranges:  Up to 24 hours.............<8.0 mg/dL  Up to 48 hours............<12.0 mg/dL  3-5 days..................<15.0 mg/dL  6-29 days.................<15.0 mg/dL       Alkaline Phosphatase   Date Value Ref Range Status   12/01/2017 108 55 - 135 U/L Final     AST   Date Value Ref Range Status   12/01/2017 22 10 - 40 U/L Final     ALT   Date Value Ref Range Status   12/01/2017 18 10 - 44 U/L Final     Anion Gap   Date Value Ref Range Status   12/01/2017 9 8 - 16 mmol/L Final     eGFR if    Date Value Ref Range Status   12/01/2017 >60 >60 mL/min/1.73 m^2 Final     eGFR if non    Date Value Ref Range Status    12/01/2017 >60 >60 mL/min/1.73 m^2 Final     Comment:     Calculation used to obtain the estimated glomerular filtration  rate (eGFR) is the CKD-EPI equation.        Lab Results   Component Value Date    WBC 4.81 12/01/2017    HGB 13.3 12/01/2017    HCT 39.5 12/01/2017    MCV 89 12/01/2017     12/01/2017     Lab Results   Component Value Date    CHOL 162 12/01/2017     Lab Results   Component Value Date    HDL 37 (L) 12/01/2017     Lab Results   Component Value Date    LDLCALC 78.4 12/01/2017     Lab Results   Component Value Date    TRIG 233 (H) 12/01/2017     Lab Results   Component Value Date    CHOLHDL 22.8 12/01/2017     No results found for: TSH, T1NAXBG, X8LTDHU, THYROIDAB    ASSESSMENT/PLAN     Melissa Jarquin is a 72 y.o. female with  Past Medical History:   Diagnosis Date    Arthritis     Breast cancer     Diabetes mellitus, type 2     Hypertension     Other hyperlipidemia 12/5/2017     Right hip pain/Bursitis of right hip, unspecified bursa- will start short steroid burst. Monitor CBG readings. May use tylenol as needed   -     predniSONE (DELTASONE) 20 MG tablet; Take 1 tablet (20 mg total) by mouth 2 (two) times daily.  Dispense: 20 tablet; Refill: 0    Type 2 diabetes mellitus without complication, without long-term current use of insulin- A1c at [facility]. Cont current meds. Low sugar diet   -     glimepiride (AMARYL) 4 MG tablet; Take 1 tablet (4 mg total) by mouth before breakfast.  Dispense: 90 tablet; Refill: 2    Malignant neoplasm of upper-outer quadrant of right female breast, unspecified estrogen receptor status- needs f/u with oncology, pt's daughter has secured records from [place] will bring to visit. Cont arimidex   -     anastrozole (ARIMIDEX) 1 mg Tab; Take 1 tablet (1 mg total) by mouth once daily.  Dispense: 90 tablet; Refill: 3    Hypertension, unspecified type- at goal. Cont current meds. Low na diet   -     captopril (CAPOTEN) 50 MG tablet; Take 1 tablet (50 mg total) by  mouth 3 (three) times daily.  Dispense: 270 tablet; Refill: 1    Other hyperlipidemia- not at goal will start lipitor 10mg daily    -     atorvastatin (LIPITOR) 10 MG tablet; Take 1 tablet (10 mg total) by mouth once daily.  Dispense: 90 tablet; Refill: 3    Gastroesophageal reflux disease, esophagitis presence not specified  -     omeprazole (PRILOSEC) 20 MG capsule; Take 1 capsule (20 mg total) by mouth once daily.  Dispense: 90 capsule; Refill: 1          Follow up with PCP in 3 months for est care appointment     Patient education provided from [place]. Patient was counseled on when and how to seek emergent care.       Екатерина ZHENG, APRN, FNP-c   Department of Internal Medicine - [facility]  9:05 AM

## 2018-02-19 NOTE — TELEPHONE ENCOUNTER
Ember Squires called back & agreed to bring patient on 2/28 to see Dr Moss to establish care (breast cancer hx.)  Most recent mammogram in Oct 2017 - will bring to visit.  Med records stops at Aug 2017.  Last mmg before that was 3/15/17.    Will hand records to Dr Moss.

## 2018-02-21 ENCOUNTER — CLINICAL SUPPORT (OUTPATIENT)
Dept: REHABILITATION | Facility: HOSPITAL | Age: 73
End: 2018-02-21
Payer: MEDICARE

## 2018-02-21 DIAGNOSIS — I97.2 POST-MASTECTOMY LYMPHEDEMA SYNDROME: ICD-10-CM

## 2018-02-21 DIAGNOSIS — G89.18 POST-MASTECTOMY PAIN: ICD-10-CM

## 2018-02-21 PROCEDURE — 97110 THERAPEUTIC EXERCISES: CPT

## 2018-02-21 PROCEDURE — 97140 MANUAL THERAPY 1/> REGIONS: CPT

## 2018-02-21 NOTE — PROGRESS NOTES
Physician: Rosemary Ritter [facility] Internal Medicine  Diagnosis: R breast CA with lumpectomy 1/2016 6 nodes removed, chemotherapy and radiation in [place]  Encounter Diagnoses   Name Primary?    Post-mastectomy lymphedema syndrome     Post-mastectomy pain         Visit #:[id number]  Treatment: manual therapy, therex, education  * Pt and her grandson have declined Stateless Interpretor.     SUBJECTIVE: Pt's grandson is present entire visit and translates Stateless- Pt has some shoulder soreness at rest and with motion can be as high as 5/10 but does vary.  Pt does feel therapy is helping.   OBJECTIVE: Stateless speaking female- amb to dept no device  Grandson present for full translation  L port in place  R breast with lumpectomy and AND sites fully healed  R breast with mild density surrounding nipple and inf segment of breast  Amount of Swelling:Location of Swelling: little to no lymphedema appreciated to R UE  Fullness and some density to R breast  Tightness and pulling in axilla and breast with R SH ROM  Mild soreness to R pec major near ant chest   Skin Integrity: healed, dryness similar R and L UE  Palpation/Texture: R breast density and tautness axilla  Circulation: intact     Posture:FH rounded shoulders protraction- min correction with repeat cueing     Treatment:   MANUAL LYMPHATIC DRAINAGE:  While supine with arm elevated,  Brief drainage along neck, B subclavian regions,  Across ant chest and top of shoulder,  Axillary region, drainage of upper arm with return of all areas proximally, scar massage and drainage of her R axilla and R breast with repeat across chest and axillary regions  In sidelying stimulation of substitution pathways,  drainage post arm, and across back and down along trunk  Manual therapy of R GH joint distraction, inf glides, stm and massage to R axilla, ant and lateral chest wall with arm overhead on pillow in sh flex abd and er  Scapular mobility while sidelyign  SEQUENTIAL COMPRESSION PUMP:  na  MULTILAYERED BANDAGING:  Na  THERAPEUTIC EXERCISES:    Gentle sh distraction, long axis   Inf glides  Assistance with ROM  Arm place overhead in sh abd flex and er on pillow- stm and massage to axilla, ant and lateral chest wall  ROM and soft tissue mobility to ribs and shoulder blade    Reviewed all prior ROM and postural corrections: dowel assisted sh flex and abd,  Scapular retractions, pec stretch in corner or doorway,  Thor sbing with arms overhead,  UE reach with spinal elongation, bow and arrow style stretch of reach and rotate, and UE and spinal elongation of table walk aways.    Performed assisted stretching and ROM R upper quadrant, stm and mobility to ant and lateral chest wall and axilla    Pt received therapeutic exercises to improve ROM, strength, flexibility and proprioception such as:   Supine dowel assisted flex x15  Chest press with dowel EE x 15  sidelying sh er  sidelying sh ABD  Sitting dowel assisted sh abd  UBE scapula retraction/protraction 3 minutes each direction   Door pulleys shoulder scaption 3 minutes   Standing  SH IR/ER with OTB used towel at elbow- Josefina for proper body mechanics   Rowing and retraction with OTB in EE and EF x 15 each  Performed scaption overhead B UE  Hands behind head for trunk sbing and axillary stretch  Scapular retraction and postural correction.  Grandson present and confirmed understanding of HEP  Pt was able to demonstrate and voice understanding of performance. Continue HEP of AROM, stretching, and postural correction.   PATIENT/FAMILY Education:  HEP,  Beginning of self massage,  Risk reduction  May use ice or warm shower for pain management as needed.   Confirmed all understanding with patient and grandson  ASSESSMENT: Pt has shown gradual improvements in ROM and tolerance to exercise.  She has some continued discomfort to her R shoulder at rest and with some motions.   Pt has noted arthritic and calcific changes to her R shoulder with rotator cuff  changes.per Xray.   Pt will need more ROM, strengthening and progression of HEP.   Working to address posture, ROM, tissue mobility and progression to improving function with less pain.   Mild density to breast but no signs of R UE lymphedema.   Patient is making  good     progress towards established goals.  PLAN: Continue PT   1-2x         weekly for Complete Decongestive Therapy:  Manual lymphatic drainage, Multilayered short stretch bandaging, Pneumatic compression, Therapeutic exercises, Patient education as deemed necessary to achieve stated goals.  Plan of care for Yessy Mcgill PTA. Discussed in face to face meeting.

## 2018-02-27 ENCOUNTER — CLINICAL SUPPORT (OUTPATIENT)
Dept: REHABILITATION | Facility: HOSPITAL | Age: 73
End: 2018-02-27
Payer: MEDICARE

## 2018-02-27 DIAGNOSIS — I97.2 POST-MASTECTOMY LYMPHEDEMA SYNDROME: ICD-10-CM

## 2018-02-27 DIAGNOSIS — G89.18 POST-MASTECTOMY PAIN: ICD-10-CM

## 2018-02-27 PROCEDURE — 97110 THERAPEUTIC EXERCISES: CPT

## 2018-02-28 ENCOUNTER — TELEPHONE (OUTPATIENT)
Dept: HEMATOLOGY/ONCOLOGY | Facility: CLINIC | Age: 73
End: 2018-02-28

## 2018-02-28 ENCOUNTER — INITIAL CONSULT (OUTPATIENT)
Dept: HEMATOLOGY/ONCOLOGY | Facility: CLINIC | Age: 73
End: 2018-02-28
Payer: MEDICARE

## 2018-02-28 VITALS
SYSTOLIC BLOOD PRESSURE: 163 MMHG | RESPIRATION RATE: 18 BRPM | BODY MASS INDEX: 28.03 KG/M2 | HEART RATE: 109 BPM | HEIGHT: 62 IN | TEMPERATURE: 99 F | DIASTOLIC BLOOD PRESSURE: 86 MMHG | WEIGHT: 152.31 LBS | OXYGEN SATURATION: 98 %

## 2018-02-28 DIAGNOSIS — C50.411 MALIGNANT NEOPLASM OF UPPER-OUTER QUADRANT OF RIGHT FEMALE BREAST, UNSPECIFIED ESTROGEN RECEPTOR STATUS: Primary | ICD-10-CM

## 2018-02-28 PROCEDURE — 99999 PR PBB SHADOW E&M-EST. PATIENT-LVL IV: CPT | Mod: PBBFAC,GC,, | Performed by: STUDENT IN AN ORGANIZED HEALTH CARE EDUCATION/TRAINING PROGRAM

## 2018-02-28 PROCEDURE — 99204 OFFICE O/P NEW MOD 45 MIN: CPT | Mod: GC,S$GLB,, | Performed by: STUDENT IN AN ORGANIZED HEALTH CARE EDUCATION/TRAINING PROGRAM

## 2018-02-28 NOTE — PROGRESS NOTES
Physical Therapy Progress Note - Hip/Knee/Ankle/Spine  R shoulder pain/weakness     Subjective:  Patient reports having pain only with some motion. PTA speaking English with Pt and received 1:1 care today.     Objective:  Treatment:   Pt received therapeutic exercises to improve ROM, strength, flexibility and proprioception such as:   UBE scapula retraction/protraction 3 minutes each direction   Door pulleys shoulder scaption 3 minutes   Shoulder flexion and HA AAROM using theraball 10 reps each   Shoulder row and extension otb 10 reps each   Shoulder IR/ER walk out otb 10 reps each - Josefina for proper body mechanics   Supine:  shoulder flexion using stick 10 reps   Elbow extension using stick 10 reps   Pt received PROM/gentle stretch on R shoulder in all available plane several trials   ROM:not tested today     Written Home Exercises Provided:no changes made.   Instructed pt. regarding: Proper technique with all exercises. Pt demonstrated good understanding of the education provided. No cultural, environmental, or spiritual barriers identified to treatment or learning.    Assessment:Pt with good tolerance to PT today, no increase of pain during session. Pt with less assistance to complete motion today. Pt also needs tactile cues for proper body mechanics.   Pt will continue to benefit from skilled PT intervention. Medical Necessity is demonstrated by:  Unable to participate in daily activities    Patient is making Good progress towards established goals.    New/Revised Goals:remain appropriated     Plan:  Continue with established Plan of Care towards PT goals.   Therex:30 minutes   Soft Tissue Mobs:10 minutes

## 2018-02-28 NOTE — TELEPHONE ENCOUNTER
----- Message from Chester Moss MD sent at 2/28/2018  8:58 AM CST -----  Schedule: referral to general surgery, ultrasound right breast/axilla, and digital mammogram    Follow up without labs: 1 month to review results.  Schedule  if possible    Please contact debo Pa at  to schedule all appointments (he speaks english fluently)

## 2018-02-28 NOTE — PROGRESS NOTES
"    PATIENT: Melissa Jarquin  MRN: [id number]  DATE: 2/28/2018      Diagnosis:   1. Malignant neoplasm of upper-outer quadrant of right female breast, unspecified estrogen receptor status        Chief Complaint: Follow-up      Oncologic History:   Oncologic History    Oncologic History 11/30/2015 Bilateral mammo  Findings: There are scattered areas of fibroglandular density.  No dominant masses or suspicious cluster of microcalcifications are seen in either side.  Right upper outer quadrant well defined nodule noted, which has increased in size when compared with previous examination 11/14/2014.  Impression: right breast suspicious nodule  Assessment : bi-rads 4A - low suspicion of malignancy  Recommendation :  Biopsy    3/2/2016 CT chest without contrast - Soft tissue mass in superlateral quadrant of right breast measuring 1.2x0.9cm.  There are several enlarged lymph nodes in the right axilla the largest measuring approximately 1.5x1.2cm.  Findings raises suspicion of metastatic lymph nodes.... Hypodense lesion in the right thyroid lobe measuring approximately 1.8x1.4cm which is nonspecific.  Dedicated thyroid ultrasound recommended....Diffuse degenerative changes of the thoracic spine...Nonspecific tiny noncalcified nodule in left apex measuring 1.4mm in diameter.  Minimal fibrolinear scar in the anterior segment of the left upper lobe."  CT abdomen and pelvis without IV contrast - Patient status post cholecystectomy...no evidence of biliary ductal dilation...stomach and GE junction are normal...spleen, pancrease, and adrenal glands are normal in size, density, and without focal masses...small calcifications in right renal collecting system measuring 2.5 and 1.7mm respectively consistent with right sided nephrolithiasis...no evidence of hydronephrosis or hydroureter....   3/25/2017  Bilateral digital mammo  Both breasts show residual fibroglandular parenchyma.  Right breast skin thickening noted.  Post " lumpectomy changes in the right breast upper outer.  no mass, other regions of architectural distoration, or clusters of microcalcifications seen in either breast.  Vascular calcifications are noted.  Scatter calcifications are seen.  No enlarged axillary lymph nodes present.  Assessment : 3, probably benign.  Recommendation: Follow up in 6 month.    MRI bilateral breasts 5/25/2017  Impression: History of right breast lumpectomy for breast carcinoma 2016.  No MRI sign of malignancy of present time.  Recommendation: Follow up mammogram and breast sonogram in 6 months.  Assessment : 3, probably benign.          Oncologic Treatment 2/2016-6/2016 4 cycles of epirubicin + cyclophosphamide followed by right breast partial mastectomy and right axillary nodes    6/7/2016 right breast partial mastectomy and right axillary nodes  Invasive ductal cell carcinoma of breast, intermediate grade, garcia combined (tubular formatoi: 2, mitotic figures/count:1, nuclear pleomorphism : 3, score = 6), measuring .2cm, identified at less than 1.0mm from the nearest inked margin of resection (anterosuperior).  Foci of DCIS (moderate grade), solid and cribriform types, adjacent/intermixed with invasive component, measuring approximately 8.0mm, located at 2.0mm from the nearest inked margin of resection (anterosuperior).  IHC ER % positive in invasive and in-situ components, CO 85-90% positive in invasive and in situ components, HER2/Magali score 0 / negative in neoplastic cells, Ki 67 25% borderline proliferative index, p53 5% / negative in neoplastic cells (ref range : <10% is negative).    8/2016-11/2016 10 doses of weekly taxol with XRT (XRT plan : radiation to right breast and supraclavicular area with 5040 cGy delivered in 28 fractions followed by boost to tumor bed for total dose of 6300 cGy.)  -Completed 6300 cGy in 35 fractions.  Start date : 8/31/2016 End date: 11/11/2016.  12/1/2016 = start date of anastrozole      "Pathology 1/14/16 - 11 o clock core biopsy upper outer quadrant right breast  Invasive duct cell type carcinoma of breast, high grade, agrcia combined histological grading system (tubular formation 3, mitotic figures/count 2 nuclear pleomorphism 3, score =8.) Measuring 9.0mm, compriisng 90% of evaluated tissue.  No In situ carcinoma is observed.  Perineural invasion is seen.  Angiolymphactic invasion is seen.  Angiolymphatic invasion is suggested.  Fibroadenomatoid changes.  Associated periductal lymphohistiocytic inflammation.  IHC   ER 95% positive  AL 90% positive  HER2/Magali 1+, negative   Ki67 : 30%, high proliferative index    (of note, no FISH was done for HER2/Magali)      3/3/2016 core biopsy of right axilla "positive for metastatic epithelial neoplasm, compatible with breast primary/site of origin".    IHC: CDX-2 negative, CK 7 positive/strong, CK20 negative, ER positive >95%, TTF-1 negative         Subjective:    Interval History: Ms. Rossi Jarquin is here for new patient consultation.    72 with breast cancer history per above.  Past medical history includes arthritis, DM, HTN, Von willebrand disease, nephrolithiasis, hyperlipidemia, neuropathy.  She is here today for transfer of care from [place] and is accompanied by her grandson.  Her grandson served as her  today.  Briefly, she was found to have a right breast lesion per routine mammography.  1/14/2016 Biopsy Er+/AL+, HER2 negative.  3/3/2016 core biopsy of right axilla "positive for metastatic epithelial neoplasm, compatible with breast primary/site of origin".  Underwent neoadjuvant chemotherapy with 4 cycles of EC followed by  6/7/2016 Partial mastectomy with limited axillary dissection.  'Pathology report revealed 1.2cm, intermediate grade, invasive ductal cell carcinoma, also found to have DCIS, angiolymphatic invasion, and 4 of 5 lymph nodes were positive for malignancy.  [id number] (IIIA G2).  Bone scan showed low probability for " "metastatic bone disease." Following her lumpectomy she received weekly paclitaxel for 10 doses with XRT.  She subsequently was started on anastrozole 2016.    She comes in today feeling well in her usual state of health.  Denies knowledge of any side effects bothering her since she has been on anastrozole.  Denies new palpable breast masses.  Has had chronic right axillary discomfort with her lymphedema for which she is seeing physical therapy.  Has intermittent discomfort at her port site on her left chest and she was wondering if it could be taken out.  Denies new skin changes over that port site.  Otherwise has not had fevers, chills, night sweats or unintentional weight loss. Appetite remains good and is independent in all activities of daily living.      Family history significant for both maternal and paternal aunts with breast cancer.  She does not know their age of diagnoses.    Gyn history: , both vaginal deliveries without bleeding complications with her history of vWD.  Has never required blood transfusion for her deliveries.  Does not take any supplemental medications when she has had cuts or epistaxis in the past.  First pregnancy at age 24.  Denies history of OCP or HRT.  Menopause estimated age 52.  No recent vaginal bleeding.    Past Medical History:   Past Medical History:   Diagnosis Date    Arthritis     Breast cancer     Diabetes mellitus, type 2     Hypertension     Other hyperlipidemia 2017       Past Surgical HIstory:   Past Surgical History:   Procedure Laterality Date    CHOLECYSTECTOMY      kidney stone          Family History:   Family History   Problem Relation Age of Onset    Diabetes Mother     Hypertension Mother     Cancer Father     Prostate cancer Father     Hypertension Sister     Diabetes Sister     Hypertension Brother     Cancer Maternal Aunt     Breast cancer Maternal Aunt     Cancer Maternal Uncle     Stomach cancer Maternal Uncle     Breast " cancer Paternal Aunt     Prostate cancer Paternal Uncle     No Known Problems Maternal Grandmother     No Known Problems Maternal Grandfather     Cancer Paternal Grandmother     Tongue cancer Paternal Grandmother     No Known Problems Paternal Grandfather     Cancer Maternal Aunt     Bladder Cancer Maternal Aunt     Breast cancer Paternal Aunt     Breast cancer Paternal Aunt     Cancer Paternal Uncle     Throat cancer Paternal Uncle        Social History:  reports that she has never smoked. She has never used smokeless tobacco. She reports that she does not drink alcohol or use drugs.    Allergies:  Review of patient's allergies indicates:   Allergen Reactions    Aspirin Other (See Comments)     Dizzy     Pcn [penicillins] Other (See Comments)     Dizzy      Shellfish containing products Rash       Medications:  Current Outpatient Prescriptions   Medication Sig Dispense Refill    anastrozole (ARIMIDEX) 1 mg Tab Take 1 tablet (1 mg total) by mouth once daily. 90 tablet 3    arnica 20 % Tinc Apply 1 application topically once daily. 120 mL 0    atorvastatin (LIPITOR) 10 MG tablet Take 1 tablet (10 mg total) by mouth once daily. 90 tablet 3    baclofen (LIORESAL) 10 MG tablet Take 10 mg by mouth 3 (three) times daily.      captopril (CAPOTEN) 50 MG tablet Take 1 tablet (50 mg total) by mouth 3 (three) times daily. 270 tablet 1    glimepiride (AMARYL) 4 MG tablet Take 1 tablet (4 mg total) by mouth before breakfast. 90 tablet 2    omeprazole (PRILOSEC) 20 MG capsule Take 1 capsule (20 mg total) by mouth once daily. 90 capsule 1     No current facility-administered medications for this visit.        Review of Systems   Constitutional: Negative for activity change, appetite change, chills, fatigue, fever and unexpected weight change.   HENT: Negative for nosebleeds.    Eyes: Negative for visual disturbance.   Respiratory: Negative for cough and shortness of breath.    Cardiovascular: Negative for  "chest pain and leg swelling.   Gastrointestinal: Negative for abdominal distention, abdominal pain, blood in stool, constipation, diarrhea, nausea and vomiting.   Genitourinary: Negative for dysuria and vaginal bleeding.   Musculoskeletal:        Right axillary discomfort   Skin: Negative for color change.   Neurological: Negative for dizziness, weakness and light-headedness.   Hematological: Does not bruise/bleed easily.   Psychiatric/Behavioral: Negative for confusion.       ECOG Performance Status:   Objective:      Vitals:   Vitals:    02/28/18 0749   BP: (!) 163/86   BP Location: Left arm   Patient Position: Sitting   BP Method: Medium (Automatic)   Pulse: 109   Resp: 18   Temp: 98.6 °F (37 °C)   TempSrc: Oral   SpO2: 98%   Weight: 69.1 kg (152 lb 5.4 oz)   Height: 5' 2" (1.575 m)     BMI: Body mass index is 27.86 kg/m².    Physical Exam   Constitutional: She appears well-developed.   HENT:   Head: Normocephalic.   Eyes: EOM are normal. Pupils are equal, round, and reactive to light.   Neck: Normal range of motion. No tracheal deviation present.   Cardiovascular: Normal rate, regular rhythm and normal heart sounds.    Pulmonary/Chest: Effort normal and breath sounds normal. No respiratory distress. She has no wheezes. She has no rales. Right breast exhibits no mass and no nipple discharge. Left breast exhibits no mass and no nipple discharge.       Abdominal: Soft. Bowel sounds are normal. She exhibits no distension and no mass. There is no tenderness. There is no guarding.   Musculoskeletal: She exhibits edema (non pitting, right axilla).   Lymphadenopathy:     She has no cervical adenopathy.   Neurological: She is alert.   Skin: Skin is warm and dry. No erythema.   Psychiatric: She has a normal mood and affect.       Laboratory Data:     Imaging:     10/5/2017 Bone mineral density (she brought her own scan from [place])  Neck T score 0.3  Troch 0.0  Inter 0.5  Total 0.5    10 year risk of major fracture " 6.5%  10 year risk of hip fracture 0.3%  Assessment:       1. Malignant neoplasm of upper-outer quadrant of right female breast, unspecified estrogen receptor status           Plan:       1. Right breast cancer [id number] (IIIA G2), s/p neoadjuvant EC followed by lumpectomy with axillary node dissection, adjuvant paclitaxel x10 doses with concurrent XRT 6300 cGy, now currently on anastrozole  -Start date anastrozole 12/1/2016.  Plan for 7-10 years.  Not experiencing any adverse effects. Continue with current therapy  -Most recent BMD 10/2017 per above.  No indication for zoledronic acid at this time. Repeat BMD scan 10/2019.  -With right axilla fullness, will obtain US right breast/axilla region where she is at greatest risk for recurrence.  -Will also order bilateral digital mammogram.  Will need mammograms annually unless otherwise specified based on results.  -Refer to general surgery for port removal  -Follow up in 1 month to review test results.    Orders Placed This Encounter   Procedures    US Breast Right Limited    Mammo Digital Diagnostic Bilateral    Ambulatory Referral to General Surgery           Chester Moss MD  Hematology Oncology Fellow PGY4  Distress Screening Results: Psychosocial Distress screening score of Distress Score: 5 noted and reviewed. No intervention indicated.

## 2018-02-28 NOTE — TELEPHONE ENCOUNTER
Called patients grandson, no answer, left message asking that he call back to assist with scheduling his grandmothers apts.

## 2018-03-06 ENCOUNTER — CLINICAL SUPPORT (OUTPATIENT)
Dept: REHABILITATION | Facility: HOSPITAL | Age: 73
End: 2018-03-06
Payer: MEDICARE

## 2018-03-06 DIAGNOSIS — I97.2 POST-MASTECTOMY LYMPHEDEMA SYNDROME: ICD-10-CM

## 2018-03-06 DIAGNOSIS — G89.18 POST-MASTECTOMY PAIN: ICD-10-CM

## 2018-03-06 PROCEDURE — 97110 THERAPEUTIC EXERCISES: CPT

## 2018-03-07 NOTE — PROGRESS NOTES
Physical Therapy Progress Note - Hip/Knee/Ankle/Spine  R shoulder pain/weakness     Subjective:  Patient reports having her sam removed on March 20.     Objective:  Treatment:   Pt received therapeutic exercises to improve ROM, strength, flexibility and proprioception such as:   UBE scapula retraction/protraction 3 minutes each direction   Door pulleys shoulder scaption 3 minutes   Shoulder flexion and HA AAROM using theraball 10 reps each   Shoulder row and extension otb 10 reps each   Shoulder IR/ER walk out otb 10 reps each - Josefina for proper body mechanics   Supine:  shoulder flexion using stick 10 reps   Elbow extension using stick 10 reps   Pt received PROM/gentle stretch on R shoulder in all available plane several trials   ROM:not tested today     Written Home Exercises Provided:no changes made.   Instructed pt. regarding: Proper technique with all exercises. Pt demonstrated good understanding of the education provided. No cultural, environmental, or spiritual barriers identified to treatment or learning.    Assessment:Pt with good tolerance to PT today, no increase of pain during session. Pt with less assistance to complete motion today. Pt also needs tactile cues for proper body mechanics.   Pt will continue to benefit from skilled PT intervention. Medical Necessity is demonstrated by:  Unable to participate in daily activities    Patient is making Good progress towards established goals.    New/Revised Goals:remain appropriated     Plan:  Continue with established Plan of Care towards PT goals.   Therex:30 minutes   Soft Tissue Mobs:10 minutes

## 2018-03-13 ENCOUNTER — CLINICAL SUPPORT (OUTPATIENT)
Dept: REHABILITATION | Facility: HOSPITAL | Age: 73
End: 2018-03-13
Payer: MEDICARE

## 2018-03-13 DIAGNOSIS — G89.18 POST-MASTECTOMY PAIN: ICD-10-CM

## 2018-03-13 DIAGNOSIS — I97.2 POST-MASTECTOMY LYMPHEDEMA SYNDROME: ICD-10-CM

## 2018-03-13 PROCEDURE — 97110 THERAPEUTIC EXERCISES: CPT

## 2018-03-13 NOTE — TELEPHONE ENCOUNTER
Copy of records was sent to scanning and orginal was given back to pt. It was pt's last lab results from October.    authored by attending      Elida Burton MD   peds hospitalist   [id number]   time 35 min

## 2018-03-14 NOTE — PROGRESS NOTES
Physical Therapy Progress Note - Hip/Knee/Ankle/Spine  R shoulder pain/weakness     Subjective:  Patient reports no pain today.     Objective:  FOTO done today: status: 49% limitation: 51%  Treatment:   Pt received therapeutic exercises to improve ROM, strength, flexibility and proprioception such as:   UBE scapula retraction/protraction 3 minutes each direction   Door pulleys shoulder scaption 3 minutes   Shoulder flexion and HA AAROM using theraball 10 reps each   Shoulder row and extension otb 10 reps each   Shoulder IR/ER walk out otb 10 reps each - Josefina for proper body mechanics   Table push up 2 x 10 reps - Josefina   Supine:  shoulder flexion using stick 10 reps   Elbow extension using stick 10 reps   Pt received PROM/gentle stretch on R shoulder in all available plane several trials   ROM:not tested today     Written Home Exercises Provided:no changes made.   Instructed pt. regarding: Proper technique with all exercises. Pt demonstrated good understanding of the education provided. No cultural, environmental, or spiritual barriers identified to treatment or learning.    Assessment:Pt with good tolerance to PT today, no increase of pain during session. Pt with good body mechanics while performing therex.   Pt will continue to benefit from skilled PT intervention. Medical Necessity is demonstrated by:  Unable to participate in daily activities    Patient is making Good progress towards established goals.    New/Revised Goals:remain appropriated     Plan:  Continue with established Plan of Care towards PT goals.   Therex:50 minutes

## 2018-03-20 ENCOUNTER — OFFICE VISIT (OUTPATIENT)
Dept: SURGERY | Facility: CLINIC | Age: 73
End: 2018-03-20
Payer: MEDICARE

## 2018-03-20 ENCOUNTER — CLINICAL SUPPORT (OUTPATIENT)
Dept: REHABILITATION | Facility: HOSPITAL | Age: 73
End: 2018-03-20
Payer: MEDICARE

## 2018-03-20 VITALS
BODY MASS INDEX: 28.14 KG/M2 | HEIGHT: 62 IN | WEIGHT: 152.88 LBS | SYSTOLIC BLOOD PRESSURE: 166 MMHG | HEART RATE: 92 BPM | TEMPERATURE: 98 F | DIASTOLIC BLOOD PRESSURE: 94 MMHG

## 2018-03-20 DIAGNOSIS — C50.411 MALIGNANT NEOPLASM OF UPPER-OUTER QUADRANT OF RIGHT FEMALE BREAST, UNSPECIFIED ESTROGEN RECEPTOR STATUS: Primary | ICD-10-CM

## 2018-03-20 DIAGNOSIS — G89.18 POST-MASTECTOMY PAIN: ICD-10-CM

## 2018-03-20 DIAGNOSIS — I97.2 POST-MASTECTOMY LYMPHEDEMA SYNDROME: ICD-10-CM

## 2018-03-20 PROCEDURE — 99999 PR PBB SHADOW E&M-EST. PATIENT-LVL III: CPT | Mod: PBBFAC,,, | Performed by: SURGERY

## 2018-03-20 PROCEDURE — 36590 REMOVAL TUNNELED CV CATH: CPT | Mod: S$GLB,,, | Performed by: SURGERY

## 2018-03-20 PROCEDURE — 97110 THERAPEUTIC EXERCISES: CPT

## 2018-03-20 PROCEDURE — 99203 OFFICE O/P NEW LOW 30 MIN: CPT | Mod: 25,S$GLB,, | Performed by: SURGERY

## 2018-03-20 NOTE — PROGRESS NOTES
[facility] - General Surgery  General Surgery  History & Physical       Subjective:     Chief Complaint: Port removal    History of Present Illness:  Patient is a 72 y.o. female with Hx of HTN, HLD, DM2, right breast cancer s/p Port-A-Cath placement (Mar 2016) and neoadjuvant chemotherapy and subsequent R breast lumpectomy and partial ALND in [place]. She recently transitioned to [facility] for her medical care and was seen by Dr. Castellano with Oncology for surveillance. She no longer needs her Port-A-Cath and presents to clinic today to discuss removal. She reports no issues with the Port during her chemotherapy or in the interim. Never had any skin changes. Never infected.    No antiplatelet or anticoagulant agents.        Current Outpatient Prescriptions on File Prior to Visit   Medication Sig    anastrozole (ARIMIDEX) 1 mg Tab Take 1 tablet (1 mg total) by mouth once daily.    arnica 20 % Tinc Apply 1 application topically once daily.    atorvastatin (LIPITOR) 10 MG tablet Take 1 tablet (10 mg total) by mouth once daily.    baclofen (LIORESAL) 10 MG tablet Take 10 mg by mouth 3 (three) times daily.    captopril (CAPOTEN) 50 MG tablet Take 1 tablet (50 mg total) by mouth 3 (three) times daily.    glimepiride (AMARYL) 4 MG tablet Take 1 tablet (4 mg total) by mouth before breakfast.    omeprazole (PRILOSEC) 20 MG capsule Take 1 capsule (20 mg total) by mouth once daily.     No current facility-administered medications on file prior to visit.      Review of patient's allergies indicates:   Allergen Reactions    Aspirin Other (See Comments)     Dizzy     Pcn [penicillins] Other (See Comments)     Dizzy      Shellfish containing products Rash     Past Medical History:   Diagnosis Date    Arthritis     Breast cancer     Diabetes mellitus, type 2     Hypertension     Other hyperlipidemia 12/5/2017     Past Surgical History:   Procedure Laterality Date    CHOLECYSTECTOMY      kidney stone        Family History      Problem Relation (Age of Onset)    Bladder Cancer Maternal Aunt    Breast cancer Maternal Aunt, Paternal Aunt, Paternal Aunt, Paternal Aunt    Cancer Father, Maternal Aunt, Maternal Uncle, Paternal Grandmother, Maternal Aunt, Paternal Uncle    Diabetes Mother, Sister    Hypertension Mother, Sister, Brother    No Known Problems Maternal Grandmother, Maternal Grandfather, Paternal Grandfather    Prostate cancer Father, Paternal Uncle    Stomach cancer Maternal Uncle    Throat cancer Paternal Uncle    Tongue cancer Paternal Grandmother        Social History Main Topics    Smoking status: Never Smoker    Smokeless tobacco: Never Used    Alcohol use No    Drug use: No    Sexual activity: No     Review of Systems   Constitutional: Negative for activity change, chills, fatigue and fever.   HENT: Negative for congestion, rhinorrhea and sore throat.    Eyes: Negative for visual disturbance.   Respiratory: Negative for cough, shortness of breath and wheezing.    Cardiovascular: Negative for chest pain, palpitations and leg swelling.   Gastrointestinal: Negative for abdominal distention, abdominal pain, constipation, diarrhea, nausea and vomiting.   Genitourinary: Negative for dysuria, frequency and hematuria.   Musculoskeletal: Negative for arthralgias and myalgias.   Skin: Negative for color change, rash and wound.   Neurological: Negative for syncope, weakness and numbness.   Hematological: Does not bruise/bleed easily.   Psychiatric/Behavioral: Negative for behavioral problems and confusion.        Objective:     Vital Signs (Most Recent):  Temp: 97.9 °F (36.6 °C) (03/20/18 1003)  Pulse: 92 (03/20/18 1003)  BP: (!) 166/94 (03/20/18 1003)     Weight: 69.3 kg (152 lb 14.2 oz)  Body mass index is 27.96 kg/m².    Physical Exam   Constitutional: She is oriented to person, place, and time. She appears well-developed and well-nourished. No distress.   HENT:   Head: Normocephalic and atraumatic.   Eyes: EOM are normal.    Neck: Normal range of motion.   Cardiovascular: Normal rate and intact distal pulses.    Pulmonary/Chest: Effort normal. No respiratory distress. She has no wheezes.       Abdominal: Soft. She exhibits no distension. There is no tenderness.   Musculoskeletal: She exhibits no edema or deformity.   Neurological: She is alert and oriented to person, place, and time.   Skin: Skin is warm and dry. No rash noted. She is not diaphoretic. No erythema.   Psychiatric: She has a normal mood and affect. Her behavior is normal.   Vitals reviewed.        Assessment/Plan:   71 y/o female with Hx of R breast cancer s/p neoadjuvant chemotherapy and lumpectomy and partial ALND no longer in need of her Port-A-Cath    - Plan for removal of Port-A-Cath in Minor Procedure Room this afternoon  - Risks, benefits, alternatives to the procedure discussed with the patient who wishes to proceed  - All questions and concerns addressed  - Consents obtained today  - Interview accomplished with the assistance of the official hospital  service ([facility])      Rajendra Gonzalez MD  Surgery Resident, PGY-III  Pager: [phone]  3/20/2018 10:32 AM

## 2018-03-20 NOTE — Clinical Note
March 20, 2018      Chester Moss MD  [address]  [place] [address]           [facility]  [address]  [place] [address]  Phone: [phone]          Patient: Melissa Jarquin   MR Number: [id number]   YOB: 1945   Date of Visit: 3/20/2018       Dear Dr. Chester Moss:    Thank you for referring Melissa Jarquin to me for evaluation. Attached you will find relevant portions of my assessment and plan of care.    If you have questions, please do not hesitate to call me. I look forward to following Melissa Jarquin along with you.    Sincerely,    Filemon Saravia MD    Enclosure  CC:  No Recipients    If you would like to receive this communication electronically, please contact [email or web address] or [phone] to request more information on [employer] access.    For providers and/or their staff who would like to refer a patient to [facility], please contact us through our one-stop-shop provider referral line, [facility], at [phone].    If you feel you have received this communication in error or would no longer like to receive these types of communications, please e-mail [email or web address]

## 2018-03-20 NOTE — PROGRESS NOTES
I have seen the patient, reviewed the Resident's history and physical, assessment and plan. I have personally interviewed and examined the patient at bedside and: agree with the findings.     For removal port.

## 2018-03-20 NOTE — PROGRESS NOTES
Physical Therapy Progress Note - Hip/Knee/Ankle/Spine  R shoulder pain/weakness     Subjective:  Patient reports no pain today; doing well.     Objective:  FOTO done today: status: 49% limitation: 51%  AROM R shoulder:  ER: 90 degrees   IR: 90 degrees   Flexion: 170 degrees   Abduction: 165 degrees     Treatment:   Pt received therapeutic exercises to improve ROM, strength, flexibility and proprioception such as:   UBE scapula retraction/protraction 3 minutes each direction   Door pulleys shoulder scaption 3 minutes   Shoulder flexion and HA AAROM using theraball 10 reps each   Shoulder row and extension otb 10 reps each   Shoulder IR/ER walk out otb 10 reps each - Josefina for proper body mechanics   Table push up 2 x 10 reps - Josefina   Supine:  shoulder flexion using stick 10 reps   Elbow extension using stick 10 reps   Pt received PROM/gentle stretch on R shoulder in all available plane several trials   ROM:not tested today     Written Home Exercises Provided:no changes made.   Instructed pt. regarding: Proper technique with all exercises. Pt demonstrated good understanding of the education provided. No cultural, environmental, or spiritual barriers identified to treatment or learning.    Assessment:Pt with good tolerance to PT today, PTA gave Pt a orange theraband to perform therex as above. Pt verbally understood.   Pt will continue to benefit from skilled PT intervention. Medical Necessity is demonstrated by:  Unable to participate in daily activities    Patient is making Good progress towards established goals.    New/Revised Goals:remain appropriated     Plan:PT will write a discharge summary on Pt.

## 2018-03-20 NOTE — LETTER
[facility] - General Surgery  [address]  [facility] [address]  Phone: [phone] March 20, 2018      Chester Moss MD  [address] [facility] [address]    Patient: Melissa Jarquin   MR Number: [id number]   YOB: 1945   Date of Visit: 3/20/2018     Dear Dr. Moss:    Thank you for referring Melissa Jarquin to me for evaluation. Below are the relevant portions of my assessment and plan of care.    Assessment/Plan:   Patient is a 72-year-old female with history of R breast cancer status post neoadjuvant chemotherapy and lumpectomy and partial ALND no longer in need of her Port-A-Cath    PLAN:  - Plan for removal of Port-A-Cath in Minor Procedure Room this afternoon  - Risks, benefits, alternatives to the procedure discussed with the patient who wishes to proceed  - All questions and concerns addressed  - Consents obtained today  - Interview accomplished with the assistance of the official hospital  service ([employer])    If you have questions, please do not hesitate to call me. I look forward to following Melissa along with you.    Sincerely,    Filemon Saravia MD   Section Head - General, Laparoscopic, Bariatric  Acute Care and Oncologic Surgery   - Surgical Weight Loss Program  [facility]    WSR/vishnu  CC  Milton Castellano MD

## 2018-03-20 NOTE — PROGRESS NOTES
DATE 3/20/2018    PREOPERATIVE DIAGNOSIS: Port-A-Cath in place.    POSTOPERATIVE DIAGNOSIS: Same.    PROCEDURE PERFORMED: Removal of left chest Port-A-Cath.    ATTENDING SURGEON: Filemon Saravia MD    HOUSESTAFF SURGEON: Rajendra Gonzalez MD (RES)    ANESTHESIA: Local.    ESTIMATED BLOOD LOSS: Minimal (< 5 mL).    FINDINGS: Port-A-Cath removed intact.    COMPLICATIONS: None.    DRAINS: None.    SPECIMEN: None.    INDICATION: Patient is a 72-year-old female with history of right breast cancer who no longer requires her Port-A-Cath for durable central venous access.    PROCEDURE IN DETAIL: The patient was identified in preoperative holding and brought back to the minor procedure room. She was placed supine on the procedure room table and padded appropriately. Her left chest wall was prepped and draped in the standard sterile surgical fashion. A timeout was performed and all team members present agreed that this was the correct procedure on the correct patient.     We began the procedure by marking an appropriate 2.5 cm horizontal skin incision. Local anesthetic was administered. Skin incision was made using a #15 scalpel. Dissection was carried down sharply until the port was encountered    Sterile dressings were applied. The patient was extubated in the operating room and transported to the recovery room in stable condition. All sponge, instrument, and needle counts were reported as correct at the end of the procedure.    Dr. Saravia was immediately available for the entire procedure.

## 2018-03-21 ENCOUNTER — DOCUMENTATION ONLY (OUTPATIENT)
Dept: REHABILITATION | Facility: HOSPITAL | Age: 73
End: 2018-03-21

## 2018-03-21 PROBLEM — I97.2 POST-MASTECTOMY LYMPHEDEMA SYNDROME: Status: RESOLVED | Noted: 2018-01-03 | Resolved: 2018-03-21

## 2018-03-21 PROBLEM — G89.18 POST-MASTECTOMY PAIN: Status: RESOLVED | Noted: 2018-01-03 | Resolved: 2018-03-21

## 2018-03-21 NOTE — PROGRESS NOTES
Pt was evaluated by PT 1/3/18 for sh OA, breast CA and risk of lymphedema.    Pt was followed 1/3/18- 3/20/18 for therex, MLD, manual therapy and education in HEP.  11 visits. No missed visits.    Pt and her grandson were trained in HEP and risk reduction for lymphedema.    Status per DC:   FOTO done today: status: 49% limitation: 51%  AROM R shoulder:  ER: 90 degrees   IR: 90 degrees   Flexion: 170 degrees   Abduction: 165 degrees     FUNCTIONAL LIMITATIONS REPORTS- G codes     Category: Carry, Moving & Handling     Tool:      FOTO      Current:        Goal:  CJ 20-40%     Discharge: :  20-40%     The following goals were discussed with the patient and patient is in agreement with them as to be addressed in the treatment plan.      Short Term Goals: ( 4-6   weeks)  Decrease fullness/firmness  in R breast  Patient will demonstrate 100% knowledge of lymphedema precautions and signs of infection.   Patient will perform self-bandaging techniques if needed.  Patient will perform self lymph drainage techniques.  Patient will tolerate increased R sh ROM by 5-15 degrees with less pain and pulling in axilla and breast.     Long Term Goals: (   6-12  weeks)  Patient will show reduction in density to mild or less with improved contour of limb./ breast  Patient to vincent/doff compression garment with daily compliance. As needed  Pt to show improved postural awareness and alignment.  Pt to show functional ROM of her R sh with little to no onset of pain or pulling in her axilla or breast.  Pt to be I and compliant with HEP.      Pt did not require compression.  Pt was educated in shoulder programs as well as lymphedema risk management.  Pt had no reported pain.   PLAN DC

## 2018-05-15 ENCOUNTER — OFFICE VISIT (OUTPATIENT)
Dept: INTERNAL MEDICINE | Facility: CLINIC | Age: 73
End: 2018-05-15
Payer: MEDICARE

## 2018-05-15 VITALS
SYSTOLIC BLOOD PRESSURE: 135 MMHG | WEIGHT: 152.31 LBS | TEMPERATURE: 99 F | BODY MASS INDEX: 28.03 KG/M2 | HEIGHT: 62 IN | OXYGEN SATURATION: 98 % | HEART RATE: 84 BPM | DIASTOLIC BLOOD PRESSURE: 87 MMHG

## 2018-05-15 DIAGNOSIS — E78.49 OTHER HYPERLIPIDEMIA: ICD-10-CM

## 2018-05-15 DIAGNOSIS — Z17.0 MALIGNANT NEOPLASM OF UPPER-OUTER QUADRANT OF RIGHT BREAST IN FEMALE, ESTROGEN RECEPTOR POSITIVE: Primary | ICD-10-CM

## 2018-05-15 DIAGNOSIS — C50.411 MALIGNANT NEOPLASM OF UPPER-OUTER QUADRANT OF RIGHT BREAST IN FEMALE, ESTROGEN RECEPTOR POSITIVE: Primary | ICD-10-CM

## 2018-05-15 DIAGNOSIS — E11.9 TYPE 2 DIABETES MELLITUS WITHOUT COMPLICATION, WITHOUT LONG-TERM CURRENT USE OF INSULIN: ICD-10-CM

## 2018-05-15 DIAGNOSIS — K21.9 GASTROESOPHAGEAL REFLUX DISEASE, ESOPHAGITIS PRESENCE NOT SPECIFIED: ICD-10-CM

## 2018-05-15 DIAGNOSIS — M79.621 AXILLARY TENDERNESS, RIGHT: ICD-10-CM

## 2018-05-15 DIAGNOSIS — I10 ESSENTIAL HYPERTENSION: ICD-10-CM

## 2018-05-15 PROCEDURE — 99214 OFFICE O/P EST MOD 30 MIN: CPT | Mod: S$GLB,,, | Performed by: INTERNAL MEDICINE

## 2018-05-15 PROCEDURE — 99999 PR PBB SHADOW E&M-EST. PATIENT-LVL III: CPT | Mod: PBBFAC,,, | Performed by: INTERNAL MEDICINE

## 2018-05-15 NOTE — PROGRESS NOTES
"Subjective:       Patient ID: Melissa Jarquin is a 72 y.o. female.    Chief Complaint: Follow-up    HPI  71 y/o woman with DM2, HTN, HLD, arthritis, GERD, thyroid nodule, breast cancer, vWD here to establish care. From [place], speaks [place] primarily. Here with grandson; requests to have him translate today, declines .  Has MD in [place].   Visit noted as "establish care" but on discussion, she is planning to move back to [place] next week, scheduled this appointment "just for a check up" to see if anything needed to be done before she returns. Does not currently wish to establish care.     HTN - on capropril 50mg. Checks at home, reports usually in 140s. No headache, vision changes, chest pain, or dyspnea.    HLD - prescribed atorvastatin 10mg, has started taking this.    DM2 - last A1c 6.6 5 months ago.  Checking about every other day - reports highest 150s, lowest in 90s  On glimepiride 4mg daily, denies lows anytime in the past 6 months.    Arthritis - saw Екатерина Ervin for this in February, treated with oral steroid for R hip bursitis; currently not having much pain.     GERD - on prilosec; didn't get a refill on this, not currently having symptoms.     Breast cancer - see Oncology note for details, diagnosed 1/2016 (ER+NV+Her2-), +axillary LN on biopsy 3/2016.   From Oncology note: Underwent neoadjuvant chemotherapy with 4 cycles of EC followed by 6/7/2016 partial mastectomy with limited axillary dissection. Pathology report revealed 1.2cm, intermediate grade, invasive ductal cell carcinoma, also found to have DCIS, angiolymphatic invasion, and 4 of 5 lymph nodes were positive for malignancy.  [id number] (IIIA G2).  Bone scan showed low probability for metastatic bone disease. Following her lumpectomy she received weekly paclitaxel for 10 doses with XRT, then started on anastrozole 12/1/2016 - planned for 7-10 year treatment.  Had port in L chest - was referred to surgery " for port removal; has had this removed (but not at [facility] - no surgery noted here)  R arm / axillary tenderness - Was recommended to have R breast/axilla ultrasound after last oncology visit as well as bilateral mammogram - plan for annual mammograms for surveillance. Hasn't had these done. Continues to have some swelling in R axilla but reports this comes & goes - overall better than 3 months ago.   Due for follow up with Oncology.  Takes baclofen for spasms/pain in R arm.    H/o von Willebrand disease - reports no abnormal bleeding or bruising.    Planned for repeat DEXA 10/2019.    Review of Systems   Constitutional: Negative for activity change and fever.   HENT: Negative.    Eyes: Negative for visual disturbance.   Respiratory: Negative.  Negative for cough and shortness of breath.    Cardiovascular: Negative for chest pain, palpitations and leg swelling.   Gastrointestinal: Negative.  Negative for abdominal pain, blood in stool, constipation and diarrhea.   Endocrine: Negative.    Genitourinary: Negative.    Musculoskeletal: Negative for gait problem and joint swelling.        As noted   Skin: Negative for rash.   Neurological: Negative for weakness and numbness.   Psychiatric/Behavioral: Negative for dysphoric mood and sleep disturbance.         Past Medical History:   Diagnosis Date    Arthritis     Breast cancer     Diabetes mellitus, type 2     Hypertension     Other hyperlipidemia 12/5/2017     Past Surgical History:   Procedure Laterality Date    CHOLECYSTECTOMY      kidney stone        Family History   Problem Relation Age of Onset    Diabetes Mother     Hypertension Mother     Cancer Father     Prostate cancer Father     Hypertension Sister     Diabetes Sister     Hypertension Brother     Cancer Maternal Aunt     Breast cancer Maternal Aunt     Cancer Maternal Uncle     Stomach cancer Maternal Uncle     Breast cancer Paternal Aunt     Prostate cancer Paternal Uncle     No Known  "Problems Maternal Grandmother     No Known Problems Maternal Grandfather     Cancer Paternal Grandmother     Tongue cancer Paternal Grandmother     No Known Problems Paternal Grandfather     Cancer Maternal Aunt     Bladder Cancer Maternal Aunt     Breast cancer Paternal Aunt     Breast cancer Paternal Aunt     Cancer Paternal Uncle     Throat cancer Paternal Uncle        Social History   Substance Use Topics    Smoking status: Never Smoker    Smokeless tobacco: Never Used    Alcohol use No       Medications and allergies reviewed.     Objective:          Vitals:    05/15/18 0953   BP: 135/87   BP Location: Right arm   Patient Position: Sitting   Pulse: 84   Temp: 98.5 °F (36.9 °C)   TempSrc: Oral   SpO2: 98%   Weight: 69.1 kg (152 lb 5.4 oz)   Height: 5' 2" (1.575 m)     Body mass index is 27.86 kg/m².  Physical Exam   Constitutional: She is oriented to person, place, and time. She appears well-developed and well-nourished. No distress.   HENT:   Head: Normocephalic and atraumatic.   Mouth/Throat: Oropharynx is clear and moist.   Eyes: Conjunctivae and EOM are normal. Pupils are equal, round, and reactive to light. No scleral icterus.   Neck: Neck supple. No thyromegaly present.   Cardiovascular: Normal rate, regular rhythm and normal heart sounds.    No murmur heard.  Pulmonary/Chest: Effort normal and breath sounds normal. No respiratory distress.   Abdominal: Soft. Bowel sounds are normal. She exhibits no distension. There is no tenderness.   Musculoskeletal: She exhibits no edema.   No significant edema in R arm.   Tenderness at R lower axilla / lateral chest wall   Lymphadenopathy:     She has no cervical adenopathy.   Neurological: She is alert and oriented to person, place, and time. She has normal strength. No cranial nerve deficit or sensory deficit. Gait normal.   Skin: Skin is warm and dry. No rash noted. No erythema.   Psychiatric: She has a normal mood and affect.   Vitals " reviewed.      Lab Results   Component Value Date    WBC 4.81 12/01/2017    HGB 13.3 12/01/2017    HCT 39.5 12/01/2017     12/01/2017    CHOL 162 12/01/2017    TRIG 233 (H) 12/01/2017    HDL 37 (L) 12/01/2017    ALT 18 12/01/2017    AST 22 12/01/2017     12/01/2017    K 4.2 12/01/2017     12/01/2017    CREATININE 0.8 12/01/2017    BUN 16 12/01/2017    CO2 31 (H) 12/01/2017    HGBA1C 6.6 (H) 12/01/2017       Assessment:       1. Malignant neoplasm of upper-outer quadrant of right breast in female, estrogen receptor positive    2. Axillary tenderness, right    3. Essential hypertension    4. Other hyperlipidemia    5. Type 2 diabetes mellitus without complication, without long-term current use of insulin    6. Gastroesophageal reflux disease, esophagitis presence not specified        Plan:   Melissa was seen today for establish care.    Diagnoses and all orders for this visit:    Malignant neoplasm of upper-outer quadrant of right breast in female, estrogen receptor positive;  Axillary tenderness, right- reviewed Oncology notes with patient and grandson including recommendations for follow up / surveillance imaging and ultrasound to further evaluate tenderness.   Offered to help schedule these before she returns to [place]; she opts to see her MD in [place] to get these studies done there.   Notes and lab results printed for her to take there for coordination of care.     Essential hypertension - near goal in clinic, recommended watch salt in diet, track BP at home and keep log, follow up with MD at home    Other hyperlipidemia - continue statin    Type 2 diabetes mellitus without complication, without long-term current use of insulin - at goal on last check, continue current medication  Recommended keep BG log, bring to next MD visit    Gastroesophageal reflux disease, esophagitis presence not specified - not on meds and not currently having symptoms    Health maintenance reviewed with patient - reminded  that she will be due for DEXA next year, check in with MD at home about this.     Follow-up if returning to [place].    Marcell Odonnell MD  Internal Medicine  [facility]  5/15/2018

## 2018-05-15 NOTE — PATIENT INSTRUCTIONS
Contact your doctor in [place] to schedule a right breast and axillary ultrasound + bilateral mammogram. These were ordered previously by your oncologist in [place].     When checking your blood pressure, check on your left arm, not on your right.     Blood Pressure Measurement:  -- Please record your blood pressure 3 times per week. When checking, make sure you have been sitting for about 5 minutes, your legs are uncrossed, and the blood pressure cuff at the level of your heart. Record your blood pressure with the date and time in a log and bring it with you to every doctor's visit.  -- Goal blood pressure is top number less than 130 and bottom number less than 80.  -- If your blood pressure is >160/>100 on two consecutive occasions, contact the office. If it is >180/>120 and you are having confusion, chest pain or back pain, shortness of breath, or blood in the urine, go to the emergency room immediately.

## 2018-10-26 NOTE — PROGRESS NOTES
Physician: Rosemary Ritter [facility] Internal Medicine  Diagnosis: R breast CA with lumpectomy 1/2016 6 nodes removed, chemotherapy and radiation in [place]  Encounter Diagnoses   Name Primary?    Post-mastectomy lymphedema syndrome     Post-mastectomy pain         Visit #: 2  Treatment: manual therapy, therex, education  * Pt and her grandson were given information for [place] translation - they again declined.    SUBJECTIVE: Pt's grandson is present entire visit and translates [place]ander- pt does understand some English for basic commands  They question frequency and repetition of exercise.  She has mild soreness in her upper back and R sh, less in R breast  Pain: 2/10   OBJECTIVE: [place]ander speaking female- amb to dept no device  Grandson present for full translation  L port in place  R breast with lumpectomy and AND sites fully healed  R breast with some density surrounding nipple and inf segment of breast  Amount of Swelling:Location of Swelling: little to no lymphedema appreciated to R UE  Fullness and some density to R breast  Tightness and pulling in axilla and breast with R SH ROM  Skin Integrity: healed, dryness similar R and L UE  Palpation/Texture: mld/mod to R breast and tautness axilla  Circulation: intact     Posture:FH rounded shoulders protraction- min correction with repeat cueing     Range of Motion - UE  (R) limited to ~ 125 flex, ~ 130 abd AROM due to c/o pulling and tightness in axilla and breast  Supine AAROM flex ~ 140, abd ~ 150 with pulling  ROT ER at 90 wfl,  IR with mild discomfort at end ranges  (L) mild limits end range no pain  Treatment:   MANUAL LYMPHATIC DRAINAGE:  While supine with arm elevated,  Drainage along neck, B subclavian regions,  Across ant chest and top of shoulder,  Axillary region, drainage of upper arm with return of all areas proximally, scar massage and drainage of her R axilla and R breast with repeat across chest and axillary regions  In sidelying stimulation of  substitution pathways,  drainage post arm, and across back and down along trunk  SEQUENTIAL COMPRESSION PUMP: na  MULTILAYERED BANDAGING:  Na  THERAPEUTIC EXERCISES:  Pt was instructed in and performed dowel assisted sh flex and abd,  Scapular retractions, pec stretch in corner or doorway,  Thor sbing with arms overhead,  UE reach with spinal elongation, bow and arrow style stretch of reach and rotate, and UE and spinal elongation of table walk aways.    Performed assisted stretching and ROM R upper quadrant, stm and mobility to ant and lateral chest wall and axilla  R UE ROM in flex abd and er  Earl assisted flex  Ball on table assisted sh ROM and trunk elongation  Pt was able to demonstrate and voice understanding of performance. Continue HEP of AROM, stretching, and postural correction.   PATIENT/FAMILY Education:  HEP,  Beginning of self massage,  Risk reduction  Confirmed all understanding with patient and grandson  ASSESSMENT: Pt is showing more flexibility and soft tissue mobility in her R upper quadrant. Pt needs to continue to address postural correction and some tightness in her R upper quadrant post CA lumpectomy as well as radiation changes to anterior, lateral chest walls, breast and axilla.    Patient is making  good     progress towards established goals.  PLAN: Continue PT   1-2x         weekly for Complete Decongestive Therapy:  Manual lymphatic drainage, Multilayered short stretch bandaging, Pneumatic compression, Therapeutic exercises, Patient education as deemed necessary to achieve stated goals.   DISCHARGE

## 2025-07-22 NOTE — Clinical Note
Schedule: referral to general surgery, ultrasound right breast/axilla, and digital mammogram  Follow up without labs: 1 month to review results.  Schedule  if possible  Please contact debo Pa at  to schedule all appointments (he speaks english fluently) You can access the FollowMyHealth Patient Portal offered by [facility] by registering at the following website: [email or web address]. By joining [employer]’s FollowMyHealth portal, you will also be able to view your health information using other applications (apps) compatible with our system.